# Patient Record
Sex: FEMALE | Race: WHITE | NOT HISPANIC OR LATINO | Employment: OTHER | URBAN - METROPOLITAN AREA
[De-identification: names, ages, dates, MRNs, and addresses within clinical notes are randomized per-mention and may not be internally consistent; named-entity substitution may affect disease eponyms.]

---

## 2018-05-06 ENCOUNTER — HOSPITAL ENCOUNTER (EMERGENCY)
Facility: HOSPITAL | Age: 48
Discharge: HOME | End: 2018-05-06
Attending: EMERGENCY MEDICINE
Payer: COMMERCIAL

## 2018-05-06 VITALS
RESPIRATION RATE: 18 BRPM | OXYGEN SATURATION: 100 % | HEART RATE: 90 BPM | BODY MASS INDEX: 45.82 KG/M2 | DIASTOLIC BLOOD PRESSURE: 84 MMHG | TEMPERATURE: 97.6 F | SYSTOLIC BLOOD PRESSURE: 137 MMHG | WEIGHT: 293 LBS

## 2018-05-06 DIAGNOSIS — M25.512 ACUTE PAIN OF LEFT SHOULDER: Primary | ICD-10-CM

## 2018-05-06 PROCEDURE — 99283 EMERGENCY DEPT VISIT LOW MDM: CPT

## 2018-05-06 RX ORDER — GABAPENTIN 100 MG/1
100 CAPSULE ORAL 2 TIMES DAILY
Qty: 28 CAPSULE | Refills: 0 | Status: SHIPPED | OUTPATIENT
Start: 2018-05-06 | End: 2018-10-16

## 2018-05-06 RX ORDER — MORPHINE SULFATE 30 MG/1
30 TABLET ORAL EVERY 4 HOURS PRN
COMMUNITY
End: 2018-10-16

## 2018-05-06 RX ORDER — GABAPENTIN 100 MG/1
100 CAPSULE ORAL ONCE
Status: COMPLETED | OUTPATIENT
Start: 2018-05-06 | End: 2018-05-06

## 2018-05-06 RX ORDER — PREDNISONE 20 MG/1
40 TABLET ORAL DAILY
Qty: 8 TABLET | Refills: 0 | Status: SHIPPED | OUTPATIENT
Start: 2018-05-07 | End: 2018-05-11

## 2018-05-06 RX ORDER — OXYCODONE HYDROCHLORIDE 15 MG/1
10 TABLET, FILM COATED, EXTENDED RELEASE ORAL EVERY 12 HOURS SCHEDULED
COMMUNITY
End: 2018-10-16

## 2018-05-06 RX ORDER — DIAZEPAM 5 MG/1
5 TABLET ORAL ONCE
Status: COMPLETED | OUTPATIENT
Start: 2018-05-06 | End: 2018-05-06

## 2018-05-06 RX ORDER — PREDNISONE 20 MG/1
40 TABLET ORAL ONCE
Status: COMPLETED | OUTPATIENT
Start: 2018-05-06 | End: 2018-05-06

## 2018-05-06 RX ORDER — TIZANIDINE HYDROCHLORIDE 6 MG/1
12 CAPSULE, GELATIN COATED ORAL
COMMUNITY
End: 2018-10-16

## 2018-05-06 RX ADMIN — PREDNISONE 40 MG: 20 TABLET ORAL at 08:21

## 2018-05-06 RX ADMIN — DIAZEPAM 5 MG: 5 TABLET ORAL at 08:28

## 2018-05-06 RX ADMIN — GABAPENTIN 100 MG: 100 CAPSULE ORAL at 08:21

## 2018-05-06 NOTE — ED NOTES
Discharge instructions and medications reviewed with pt  Pt verbalized understanding, with no further questions at this time  Pt ambulatory out of department, with , as ride home       Kelsey Wu RN  05/06/18 0780

## 2018-05-06 NOTE — DISCHARGE INSTRUCTIONS
Shoulder Pain   WHAT YOU NEED TO KNOW:   Shoulder pain is a common problem and can affect your daily activities  Pain can be caused by a problem within your shoulder  Shoulder pain may also be caused by pain that spreads to your shoulder from another part of your body  DISCHARGE INSTRUCTIONS:   Return to the emergency department if:   · You have severe pain  · You cannot move your arm or shoulder  · You have numbness or tingling in your shoulder or arm  Contact your healthcare provider if:   · Your pain gets worse or does not go away with treatment  · You have trouble moving your arm or shoulder  · You have questions or concerns about your condition or care  Medicines: You may need any of the following:  · Acetaminophen  decreases pain and fever  It is available without a doctor's order  Ask how much to take and how often to take it  Follow directions  Acetaminophen can cause liver damage if not taken correctly  · NSAIDs , such as ibuprofen, help decrease swelling, pain, and fever  This medicine is available with or without a doctor's order  NSAIDs can cause stomach bleeding or kidney problems in certain people  If you take blood thinner medicine, always ask your healthcare provider if NSAIDs are safe for you  Always read the medicine label and follow directions  · Take your medicine as directed  Contact your healthcare provider if you think your medicine is not helping or if you have side effects  Tell him of her if you are allergic to any medicine  Keep a list of the medicines, vitamins, and herbs you take  Include the amounts, and when and why you take them  Bring the list or the pill bottles to follow-up visits  Carry your medicine list with you in case of an emergency  Follow up with your healthcare provider or orthopedist as directed:  Write down your questions so you remember to ask them during your visits     Manage your symptoms:   · Apply ice  on your shoulder for 20 to 30 minutes every 2 hours or as directed  Use an ice pack, or put crushed ice in a plastic bag  Cover it with a towel  Ice helps prevent tissue damage and decreases swelling and pain  · Apply heat if ice does not help your symptoms  Apply heat on your shoulder for 20 to 30 minutes every 2 hours for as many days as directed  Heat helps decrease pain and muscle spasms  · Go to physical or occupational therapy as directed  A physical therapist teaches you exercises to help improve movement and strength, and to decrease pain  An occupational therapist teaches you skills to help with your daily activities  Prevent shoulder pain:   · Stretch and strengthen your shoulder  Use proper technique during exercises and sports  · Limit activities as directed  Try to avoid repeated overhead movements  © 2017 [facility] Information is for End User's use only and may not be sold, redistributed or otherwise used for commercial purposes  All illustrations and images included in CareNotes® are the copyrighted property of [employer]  or Abdoulaye Landa  The above information is an  only  It is not intended as medical advice for individual conditions or treatments  Talk to your doctor, nurse or pharmacist before following any medical regimen to see if it is safe and effective for you

## 2018-10-16 ENCOUNTER — OFFICE VISIT (OUTPATIENT)
Dept: FAMILY MEDICINE CLINIC | Facility: CLINIC | Age: 48
End: 2018-10-16
Payer: COMMERCIAL

## 2018-10-16 VITALS
OXYGEN SATURATION: 98 % | WEIGHT: 274 LBS | RESPIRATION RATE: 18 BRPM | SYSTOLIC BLOOD PRESSURE: 120 MMHG | HEIGHT: 67 IN | HEART RATE: 95 BPM | BODY MASS INDEX: 43 KG/M2 | DIASTOLIC BLOOD PRESSURE: 78 MMHG

## 2018-10-16 DIAGNOSIS — M79.7 FIBROMYALGIA: ICD-10-CM

## 2018-10-16 DIAGNOSIS — E66.01 CLASS 3 SEVERE OBESITY DUE TO EXCESS CALORIES WITHOUT SERIOUS COMORBIDITY WITH BODY MASS INDEX (BMI) OF 40.0 TO 44.9 IN ADULT (HCC): ICD-10-CM

## 2018-10-16 DIAGNOSIS — J02.9 PHARYNGITIS, UNSPECIFIED ETIOLOGY: ICD-10-CM

## 2018-10-16 DIAGNOSIS — M50.30 DEGENERATIVE DISC DISEASE, CERVICAL: ICD-10-CM

## 2018-10-16 DIAGNOSIS — Z13.220 LIPID SCREENING: ICD-10-CM

## 2018-10-16 DIAGNOSIS — Z00.00 WELL ADULT EXAM: Primary | ICD-10-CM

## 2018-10-16 DIAGNOSIS — Z12.31 SCREENING MAMMOGRAM, ENCOUNTER FOR: ICD-10-CM

## 2018-10-16 DIAGNOSIS — R59.1 LYMPHADENOPATHY OF HEAD AND NECK: ICD-10-CM

## 2018-10-16 DIAGNOSIS — M50.20 CERVICAL HERNIATED DISC: ICD-10-CM

## 2018-10-16 PROBLEM — E66.813 CLASS 3 SEVERE OBESITY DUE TO EXCESS CALORIES WITHOUT SERIOUS COMORBIDITY WITH BODY MASS INDEX (BMI) OF 40.0 TO 44.9 IN ADULT (HCC): Status: ACTIVE | Noted: 2018-10-16

## 2018-10-16 PROCEDURE — 99386 PREV VISIT NEW AGE 40-64: CPT | Performed by: NURSE PRACTITIONER

## 2018-10-16 PROCEDURE — 1036F TOBACCO NON-USER: CPT | Performed by: NURSE PRACTITIONER

## 2018-10-16 PROCEDURE — 99214 OFFICE O/P EST MOD 30 MIN: CPT | Performed by: NURSE PRACTITIONER

## 2018-10-16 PROCEDURE — 3008F BODY MASS INDEX DOCD: CPT | Performed by: NURSE PRACTITIONER

## 2018-10-16 RX ORDER — GABAPENTIN 300 MG/1
300 CAPSULE ORAL 3 TIMES DAILY
Refills: 1 | COMMUNITY
Start: 2018-09-19

## 2018-10-16 RX ORDER — TIZANIDINE 4 MG/1
8 TABLET ORAL
Refills: 3 | COMMUNITY
Start: 2018-10-03 | End: 2021-01-12 | Stop reason: ALTCHOICE

## 2018-10-16 RX ORDER — OXYCODONE HYDROCHLORIDE 5 MG/1
5 TABLET ORAL 2 TIMES DAILY PRN
Refills: 0 | COMMUNITY
Start: 2018-09-21

## 2018-10-16 RX ORDER — MELOXICAM 15 MG/1
TABLET ORAL
Refills: 2 | COMMUNITY
Start: 2018-09-22

## 2018-10-16 RX ORDER — AMOXICILLIN 875 MG/1
875 TABLET, COATED ORAL 2 TIMES DAILY
Qty: 10 TABLET | Refills: 0 | Status: SHIPPED | OUTPATIENT
Start: 2018-10-16 | End: 2018-10-21

## 2018-10-16 NOTE — PROGRESS NOTES
Assessment/Plan:    No problem-specific Assessment & Plan notes found for this encounter  Diagnoses and all orders for this visit:    Well adult exam  -     Comprehensive metabolic panel; Future  -     Lipid panel; Future    Lipid screening  -     Comprehensive metabolic panel; Future  -     Lipid panel; Future    Screening mammogram, encounter for  -     Mammo screening bilateral w cad; Future  -     Comprehensive metabolic panel; Future  -     Lipid panel; Future    Class 3 severe obesity due to excess calories without serious comorbidity with body mass index (BMI) of 40 0 to 44 9 in adult [facility])    Degenerative disc disease, cervical    Cervical herniated disc    Fibromyalgia    Pharyngitis, unspecified etiology    Lymphadenopathy of head and neck  -     CBC and differential; Future  -     amoxicillin (AMOXIL) 875 mg tablet; Take 1 tablet (875 mg total) by mouth 2 (two) times a day for 5 days    Other orders  -     oxyCODONE (ROXICODONE) 5 mg immediate release tablet; Take 5 mg by mouth 2 (two) times a day as needed  -     meloxicam (MOBIC) 15 mg tablet; TAKE 1 TABLET BY MOUTH EVERY DAY AS NEEDED (NOT DAILY USE)  -     gabapentin (NEURONTIN) 300 mg capsule; Take 300 mg by mouth 3 (three) times a day  -     tiZANidine (ZANAFLEX) 4 mg tablet; 8 mg daily at bedtime          Subjective:      Patient ID: Antonietta Franz is a 53 y o  female  Pt is here to become Established and have a work physical   She hasnt seen her PCP for "a while"  Difficulty getting appointments  She needs a Physical for work insurance  Chronic medical conditions:  Fibromyalgia- pain in all joints  She takes Oxycodone and Gabapentin  She sees Pain Mngmt in [place]  Degenerative disc disease of spine- she is getting steroid injections from her pain mngmnt  Frequent headaches- from cervical disc disease  Last bloodwork done 1 year ago     Needs Mammogram  Patient is also reporting swollen gland son left side, sore throat and no ear pain            The following portions of the patient's history were reviewed and updated as appropriate:   She  has a past medical history of Carpal tunnel syndrome; Fibromyalgia; Lupus; and Spinal stenosis  She   Patient Active Problem List    Diagnosis Date Noted    Lipid screening 10/16/2018    Screening mammogram, encounter for 10/16/2018    Class 3 severe obesity due to excess calories without serious comorbidity with body mass index (BMI) of 40 0 to 44 9 in adult [facility]) 10/16/2018    Degenerative disc disease, cervical 10/16/2018    Cervical herniated disc 10/16/2018    Fibromyalgia 10/16/2018    Pharyngitis 10/16/2018    Lymphadenopathy of head and neck 10/16/2018     She  has a past surgical history that includes  section and Knee surgery  Her family history includes Ovarian cancer in her sister  She  reports that she has never smoked  She has never used smokeless tobacco  She reports that she does not drink alcohol or use drugs  Current Outpatient Prescriptions   Medication Sig Dispense Refill    amoxicillin (AMOXIL) 875 mg tablet Take 1 tablet (875 mg total) by mouth 2 (two) times a day for 5 days 10 tablet 0    gabapentin (NEURONTIN) 300 mg capsule Take 300 mg by mouth 3 (three) times a day  1    meloxicam (MOBIC) 15 mg tablet TAKE 1 TABLET BY MOUTH EVERY DAY AS NEEDED (NOT DAILY USE)  2    oxyCODONE (ROXICODONE) 5 mg immediate release tablet Take 5 mg by mouth 2 (two) times a day as needed  0    tiZANidine (ZANAFLEX) 4 mg tablet 8 mg daily at bedtime  3     No current facility-administered medications for this visit        Current Outpatient Prescriptions on File Prior to Visit   Medication Sig    [DISCONTINUED] gabapentin (NEURONTIN) 100 mg capsule Take 1 capsule (100 mg total) by mouth 2 (two) times a day for 14 days    [DISCONTINUED] morphine (MSIR) 30 MG tablet Take 30 mg by mouth every 4 (four) hours as needed for severe pain    [DISCONTINUED] oxyCODONE (OxyCONTIN) 15 mg 12 hr tablet Take 10 mg by mouth every 12 (twelve) hours    [DISCONTINUED] TiZANidine (ZANAFLEX) 6 MG capsule Take 12 mg by mouth     No current facility-administered medications on file prior to visit  She is allergic to epinephrine; lidocaine; and moxifloxacin       Review of Systems   Constitutional: Negative  Negative for chills, fatigue, fever and unexpected weight change  HENT: Positive for sore throat  Negative for congestion, dental problem, ear pain, trouble swallowing and voice change  Eyes: Positive for visual disturbance  Negative for photophobia, pain, discharge, redness and itching  Awaiting appmnt with eye doctor   Respiratory: Negative for cough, chest tightness, shortness of breath, wheezing and stridor  Cardiovascular: Negative  Negative for chest pain, palpitations and leg swelling  Gastrointestinal: Negative  Negative for abdominal distention, abdominal pain, constipation, diarrhea, nausea and vomiting  Endocrine: Negative for polydipsia and polyuria  Genitourinary: Negative for difficulty urinating and dysuria  Musculoskeletal: Positive for arthralgias, back pain, myalgias, neck pain and neck stiffness  Skin: Negative  Negative for rash  Allergic/Immunologic: Negative for immunocompromised state  Neurological: Positive for headaches  Negative for dizziness, tremors, seizures, weakness, light-headedness and numbness  Hematological: Positive for adenopathy  Does not bruise/bleed easily  Left side of neck   Psychiatric/Behavioral: Negative  Negative for confusion and sleep disturbance  The patient is not nervous/anxious  All other systems reviewed and are negative  Objective:      /78 (BP Location: Left arm, Patient Position: Sitting)   Pulse 95   Resp 18   Ht 5' 7" (1 702 m)   Wt 124 kg (274 lb)   SpO2 98%   BMI 42 91 kg/m²          Physical Exam   Constitutional: She is oriented to person, place, and time   She appears well-developed and well-nourished  No distress  HENT:   Head: Normocephalic and atraumatic  Right Ear: External ear normal    Left Ear: External ear normal    Nose: Nose normal    Mouth/Throat: Oropharynx is clear and moist  No oropharyngeal exudate  No tonsils   Eyes: Pupils are equal, round, and reactive to light  Conjunctivae and EOM are normal  Right eye exhibits no discharge  Left eye exhibits no discharge  No scleral icterus  Neck: Normal range of motion  Neck supple  No JVD present  No thyromegaly present  Tender left side anterior cervical adenopathy   Cardiovascular: Normal rate, regular rhythm, normal heart sounds and intact distal pulses  Exam reveals no gallop and no friction rub  No murmur heard  Pulmonary/Chest: Effort normal and breath sounds normal  No respiratory distress  She has no wheezes  Abdominal: Soft  Bowel sounds are normal  She exhibits no distension and no mass  There is no tenderness  obesity   Musculoskeletal: Normal range of motion  She exhibits no edema, tenderness or deformity  Lymphadenopathy:     She has cervical adenopathy  Neurological: She is alert and oriented to person, place, and time  She exhibits normal muscle tone  Coordination normal    Skin: Skin is warm and dry  No rash noted  She is not diaphoretic  No pallor  Psychiatric: She has a normal mood and affect  Her behavior is normal  Judgment and thought content normal    Nursing note and vitals reviewed

## 2018-10-16 NOTE — PATIENT INSTRUCTIONS
Establish care visit  Our office will complete form for work insurance and fax, when labs are back  Obesity- encourage daily exercise  Limit calories to 1800 a day  Limit carbohydrates and sugars  Pharyngitis- no abnormality on exam   Lymphadenopathy- with tenderness- start brief course of antibiotics  Obtain labs PRIOR to starting antibiotic  Cervical disc disease and fibromyalgia- followed by [facility]  Obtain screening mammogram   Our office will call with lab results  Heart Healthy Diet   AMBULATORY CARE:   A heart healthy diet  is an eating plan low in total fat, unhealthy fats, and sodium (salt)  A heart healthy diet helps decrease your risk for heart disease and stroke  Limit the amount of fat you eat to 25% to 35% of your total daily calories  Limit sodium to less than 2,300 mg each day  Healthy fats:  Healthy fats can help improve cholesterol levels  The risk for heart disease is decreased when cholesterol levels are normal  Choose healthy fats, such as the following:  · Unsaturated fat  is found in foods such as soybean, canola, olive, corn, and safflower oils  It is also found in soft tub margarine that is made with liquid vegetable oil  · Omega-3 fat  is found in certain fish, such as salmon, tuna, and trout, and in walnuts and flaxseed  Unhealthy fats:  Unhealthy fats can cause unhealthy cholesterol levels in your blood and increase your risk of heart disease  Limit unhealthy fats, such as the following:  · Cholesterol  is found in animal foods, such as eggs and lobster, and in dairy products made from whole milk  Limit cholesterol to less than 300 milligrams (mg) each day  You may need to limit cholesterol to 200 mg each day if you have heart disease  · Saturated fat  is found in meats, such as farmer and hamburger  It is also found in chicken or turkey skin, whole milk, and butter  Limit saturated fat to less than 7% of your total daily calories   Limit saturated fat to less than 6% if you have heart disease or are at increased risk for it  · Trans fat  is found in packaged foods, such as potato chips and cookies  It is also in hard margarine, some fried foods, and shortening  Avoid trans fats as much as possible    Heart healthy foods and drinks to include:  Ask your dietitian or healthcare provider how many servings to have from each of the following food groups:  · Grains:      ¨ Whole-wheat breads, cereals, and pastas, and brown rice    ¨ Low-fat, low-sodium crackers and chips    · Vegetables:      ¨ Broccoli, green beans, green peas, and spinach    ¨ Collards, kale, and lima beans    ¨ Carrots, sweet potatoes, tomatoes, and peppers    ¨ Canned vegetables with no salt added    · Fruits:      ¨ Bananas, peaches, pears, and pineapple    ¨ Grapes, raisins, and dates    ¨ Oranges, tangerines, grapefruit, orange juice, and grapefruit juice    ¨ Apricots, mangoes, melons, and papaya    ¨ Raspberries and strawberries    ¨ Canned fruit with no added sugar    · Low-fat dairy products:      ¨ Nonfat (skim) milk, 1% milk, and low-fat almond, cashew, or soy milks fortified with calcium    ¨ Low-fat cheese, regular or frozen yogurt, and cottage cheese    · Meats and proteins , such as lean cuts of beef and pork (loin, leg, round), skinless chicken and turkey, legumes, soy products, egg whites, and nuts  Foods and drinks to limit or avoid:  Ask your dietitian or healthcare provider about these and other foods that are high in unhealthy fat, sodium, and sugar:  · Snack or packaged foods , such as frozen dinners, cookies, macaroni and cheese, and cereals with more than 300 mg of sodium per serving    · Canned or dry mixes  for cakes, soups, sauces, or gravies    · Vegetables with added sodium , such as instant potatoes, vegetables with added sauces, or regular canned vegetables    · Other foods high in sodium , such as ketchup, barbecue sauce, salad dressing, pickles, olives, soy sauce, and miso    · High-fat dairy foods  such as whole or 2% milk, cream cheese, or sour cream, and cheeses     · High-fat protein foods  such as high-fat cuts of beef (T-bone steaks, ribs), chicken or turkey with skin, and organ meats, such as liver    · Cured or smoked meats , such as hot dogs, farmer, and sausage    · Unhealthy fats and oils , such as butter, stick margarine, shortening, and cooking oils such as coconut or palm oil    · Food and drinks high in sugar , such as soft drinks (soda), sports drinks, sweetened tea, candy, cake, cookies, pies, and doughnuts  Other diet guidelines to follow:   · Eat more foods containing omega-3 fats  Eat fish high in omega-3 fats at least 2 times a week  · Limit alcohol  Too much alcohol can damage your heart and raise your blood pressure  Women should limit alcohol to 1 drink a day  Men should limit alcohol to 2 drinks a day  A drink of alcohol is 12 ounces of beer, 5 ounces of wine, or 1½ ounces of liquor  · Choose low-sodium foods  High-sodium foods can lead to high blood pressure  Add little or no salt to food you prepare  Use herbs and spices in place of salt  · Eat more fiber  to help lower cholesterol levels  Eat at least 5 servings of fruits and vegetables each day  Eat 3 ounces of whole-grain foods each day  Legumes (beans) are also a good source of fiber  Lifestyle guidelines:   · Do not smoke  Nicotine and other chemicals in cigarettes and cigars can cause lung and heart damage  Ask your healthcare provider for information if you currently smoke and need help to quit  E-cigarettes or smokeless tobacco still contain nicotine  Talk to your healthcare provider before you use these products  · Exercise regularly  to help you maintain a healthy weight and improve your blood pressure and cholesterol levels  Ask your healthcare provider about the best exercise plan for you  Do not start an exercise program without asking your healthcare provider     Follow up with your healthcare provider as directed:  Write down your questions so you remember to ask them during your visits  © 2017 [employer] Information is for End User's use only and may not be sold, redistributed or otherwise used for commercial purposes  All illustrations and images included in CareNotes® are the copyrighted property of A D A M [employer]  or Abdoulaye Landa  The above information is an  only  It is not intended as medical advice for individual conditions or treatments  Talk to your doctor, nurse or pharmacist before following any medical regimen to see if it is safe and effective for you  Wellness Visit for Adults   WHAT YOU NEED TO KNOW:   What is a wellness visit? A wellness visit is when you see your healthcare provider to get screened for health problems  You can also get advice on how to stay healthy  Write down your questions so you remember to ask them  Ask your healthcare provider how often you should have a wellness visit  What happens at a wellness visit? Your healthcare provider will ask about your health, and your family history of health problems  This includes high blood pressure, heart disease, and cancer  He or she will ask if you have symptoms that concern you, if you smoke, and about your mood  You may also be asked about your intake of medicines, supplements, food, and alcohol  Any of the following may be done:  · Your weight  will be checked  Your height may also be checked so your body mass index (BMI) can be calculated  Your BMI shows if you are at a healthy weight  · Your blood pressure  and heart rate will be checked  Your temperature may also be checked  · Blood and urine tests  may be done  Blood tests may be done to check your cholesterol levels  Abnormal cholesterol levels increase your risk for heart disease and stroke  You may also need a blood or urine test to check for diabetes if you are at increased risk   Urine tests may be done to look for signs of an infection or kidney disease  · A physical exam  includes checking your heartbeat and lungs with a stethoscope  Your healthcare provider may also check your skin to look for sun damage  · Screening tests  may be recommended  A screening test is done to check for diseases that may not cause symptoms  The screening tests you may need depend on your age, gender, family history, and lifestyle habits  For example, colorectal screening may be recommended if you are 48years old or older  What screening tests do I need if I am a woman? · A Pap smear  is used to screen for cervical cancer  Pap smears are usually done every 3 to 5 years depending on your age  You may need them more often if you have had abnormal Pap smear test results in the past  Ask your healthcare provider how often you should have a Pap smear  · A mammogram  is an x-ray of your breasts to screen for breast cancer  Experts recommend mammograms every 2 years starting at age 48 years  You may need a mammogram at age 52 years or younger if you have an increased risk for breast cancer  Talk to your healthcare provider about when you should start having mammograms and how often you need them  What vaccines might I need? · Get an influenza vaccine  every year  The influenza vaccine protects you from the flu  Several types of viruses cause the flu  The viruses change over time, so new vaccines are made each year  · Get a tetanus-diphtheria (Td) booster vaccine  every 10 years  This vaccine protects you against tetanus and diphtheria  Tetanus is a severe infection that may cause painful muscle spasms and lockjaw  Diphtheria is a severe bacterial infection that causes a thick covering in the back of your mouth and throat  · Get a human papillomavirus (HPV) vaccine  if you are female and aged 23 to 32 or male 23 to 24 and never received it  This vaccine protects you from HPV infection   HPV is the most common infection spread by sexual contact  HPV may also cause vaginal, penile, and anal cancers  · Get a pneumococcal vaccine  if you are aged 72 years or older  The pneumococcal vaccine is an injection given to protect you from pneumococcal disease  Pneumococcal disease is an infection caused by pneumococcal bacteria  The infection may cause pneumonia, meningitis, or an ear infection  · Get a shingles vaccine  if you are aged 61 or older, even if you have had shingles before  The shingles vaccine is an injection to protect you from the varicella-zoster virus  This is the same virus that causes chickenpox  Shingles is a painful rash that develops in people who had chickenpox or have been exposed to the virus  How can I eat healthy? My Plate is a model for planning healthy meals  It shows the types and amounts of foods that should go on your plate  Fruits and vegetables make up about half of your plate, and grains and protein make up the other half  A serving of dairy is included on the side of your plate  The amount of calories and serving sizes you need depends on your age, gender, weight, and height  Examples of healthy foods are listed below:  · Eat a variety of vegetables  such as dark green, red, and orange vegetables  You can also include canned vegetables low in sodium (salt) and frozen vegetables without added butter or sauces  · Eat a variety of fresh fruits , canned fruit in 100% juice, frozen fruit, and dried fruit  · Include whole grains  At least half of the grains you eat should be whole grains  Examples include whole-wheat bread, wheat pasta, brown rice, and whole-grain cereals such as oatmeal     · Eat a variety of protein foods such as seafood (fish and shellfish), lean meat, and poultry without skin (turkey and chicken)  Examples of lean meats include pork leg, shoulder, or tenderloin, and beef round, sirloin, tenderloin, and extra lean ground beef   Other protein foods include eggs and egg substitutes, beans, peas, soy products, nuts, and seeds  · Choose low-fat dairy products such as skim or 1% milk or low-fat yogurt, cheese, and cottage cheese  · Limit unhealthy fats  such as butter, hard margarine, and shortening  How much exercise do I need? Exercise at least 30 minutes per day on most days of the week  Some examples of exercise include walking, biking, dancing, and swimming  You can also fit in more physical activity by taking the stairs instead of the elevator or parking farther away from stores  Include muscle strengthening activities 2 days each week  Regular exercise provides many health benefits  It helps you manage your weight, and decreases your risk for type 2 diabetes, heart disease, stroke, and high blood pressure  Exercise can also help improve your mood  Ask your healthcare provider about the best exercise plan for you  What are some general health and safety guidelines I should follow? · Do not smoke  Nicotine and other chemicals in cigarettes and cigars can cause lung damage  Ask your healthcare provider for information if you currently smoke and need help to quit  E-cigarettes or smokeless tobacco still contain nicotine  Talk to your healthcare provider before you use these products  · Limit alcohol  A drink of alcohol is 12 ounces of beer, 5 ounces of wine, or 1½ ounces of liquor  · Lose weight, if needed  Being overweight increases your risk of certain health conditions  These include heart disease, high blood pressure, type 2 diabetes, and certain types of cancer  · Protect your skin  Do not sunbathe or use tanning beds  Use sunscreen with a SPF 15 or higher  Apply sunscreen at least 15 minutes before you go outside  Reapply sunscreen every 2 hours  Wear protective clothing, hats, and sunglasses when you are outside  · Drive safely  Always wear your seatbelt  Make sure everyone in your car wears a seatbelt   A seatbelt can save your life if you are in an accident  Do not use your cell phone when you are driving  This could distract you and cause an accident  Pull over if you need to make a call or send a text message  · Practice safe sex  Use latex condoms if are sexually active and have more than one partner  Your healthcare provider may recommend screening tests for sexually transmitted infections (STIs)  · Wear helmets, lifejackets, and protective gear  Always wear a helmet when you ride a bike or motorcycle, go skiing, or play sports that could cause a head injury  Wear protective equipment when you play sports  Wear a lifejacket when you are on a boat or doing water sports  CARE AGREEMENT:   You have the right to help plan your care  Learn about your health condition and how it may be treated  Discuss treatment options with your caregivers to decide what care you want to receive  You always have the right to refuse treatment  The above information is an  only  It is not intended as medical advice for individual conditions or treatments  Talk to your doctor, nurse or pharmacist before following any medical regimen to see if it is safe and effective for you  © 2017 [facility] [facility] Information is for End User's use only and may not be sold, redistributed or otherwise used for commercial purposes  All illustrations and images included in CareNotes® are the copyrighted property of A D A M , Inc  or Abdoulaye Landa

## 2018-10-20 ENCOUNTER — APPOINTMENT (OUTPATIENT)
Dept: LAB | Facility: CLINIC | Age: 48
End: 2018-10-20
Payer: COMMERCIAL

## 2018-10-20 DIAGNOSIS — Z13.220 LIPID SCREENING: ICD-10-CM

## 2018-10-20 DIAGNOSIS — Z00.00 WELL ADULT EXAM: ICD-10-CM

## 2018-10-20 DIAGNOSIS — R59.1 LYMPHADENOPATHY OF HEAD AND NECK: ICD-10-CM

## 2018-10-20 DIAGNOSIS — Z12.31 SCREENING MAMMOGRAM, ENCOUNTER FOR: ICD-10-CM

## 2018-10-20 LAB
ALBUMIN SERPL BCP-MCNC: 3.8 G/DL (ref 3.5–5)
ALP SERPL-CCNC: 56 U/L (ref 46–116)
ALT SERPL W P-5'-P-CCNC: 37 U/L (ref 12–78)
ANION GAP SERPL CALCULATED.3IONS-SCNC: 5 MMOL/L (ref 4–13)
AST SERPL W P-5'-P-CCNC: 18 U/L (ref 5–45)
BASOPHILS # BLD AUTO: 0.07 THOUSANDS/ΜL (ref 0–0.1)
BASOPHILS NFR BLD AUTO: 1 % (ref 0–1)
BILIRUB SERPL-MCNC: 0.59 MG/DL (ref 0.2–1)
BUN SERPL-MCNC: 11 MG/DL (ref 5–25)
CALCIUM ALBUM COR SERPL-MCNC: 10.4 MG/DL (ref 8.3–10.1)
CALCIUM SERPL-MCNC: 10.2 MG/DL (ref 8.3–10.1)
CHLORIDE SERPL-SCNC: 106 MMOL/L (ref 100–108)
CHOLEST SERPL-MCNC: 178 MG/DL (ref 50–200)
CO2 SERPL-SCNC: 27 MMOL/L (ref 21–32)
CREAT SERPL-MCNC: 0.57 MG/DL (ref 0.6–1.3)
EOSINOPHIL # BLD AUTO: 0.1 THOUSAND/ΜL (ref 0–0.61)
EOSINOPHIL NFR BLD AUTO: 2 % (ref 0–6)
ERYTHROCYTE [DISTWIDTH] IN BLOOD BY AUTOMATED COUNT: 12.9 % (ref 11.6–15.1)
GFR SERPL CREATININE-BSD FRML MDRD: 111 ML/MIN/1.73SQ M
GLUCOSE P FAST SERPL-MCNC: 79 MG/DL (ref 65–99)
HCT VFR BLD AUTO: 39.5 % (ref 34.8–46.1)
HDLC SERPL-MCNC: 50 MG/DL (ref 40–60)
HGB BLD-MCNC: 12.2 G/DL (ref 11.5–15.4)
IMM GRANULOCYTES # BLD AUTO: 0.02 THOUSAND/UL (ref 0–0.2)
IMM GRANULOCYTES NFR BLD AUTO: 0 % (ref 0–2)
LDLC SERPL CALC-MCNC: 105 MG/DL (ref 0–100)
LYMPHOCYTES # BLD AUTO: 2.39 THOUSANDS/ΜL (ref 0.6–4.47)
LYMPHOCYTES NFR BLD AUTO: 41 % (ref 14–44)
MCH RBC QN AUTO: 28.6 PG (ref 26.8–34.3)
MCHC RBC AUTO-ENTMCNC: 30.9 G/DL (ref 31.4–37.4)
MCV RBC AUTO: 93 FL (ref 82–98)
MONOCYTES # BLD AUTO: 0.58 THOUSAND/ΜL (ref 0.17–1.22)
MONOCYTES NFR BLD AUTO: 10 % (ref 4–12)
NEUTROPHILS # BLD AUTO: 2.67 THOUSANDS/ΜL (ref 1.85–7.62)
NEUTS SEG NFR BLD AUTO: 46 % (ref 43–75)
NONHDLC SERPL-MCNC: 128 MG/DL
NRBC BLD AUTO-RTO: 0 /100 WBCS
PLATELET # BLD AUTO: 341 THOUSANDS/UL (ref 149–390)
PMV BLD AUTO: 10.8 FL (ref 8.9–12.7)
POTASSIUM SERPL-SCNC: 4.2 MMOL/L (ref 3.5–5.3)
PROT SERPL-MCNC: 6.9 G/DL (ref 6.4–8.2)
RBC # BLD AUTO: 4.26 MILLION/UL (ref 3.81–5.12)
SODIUM SERPL-SCNC: 138 MMOL/L (ref 136–145)
TRIGL SERPL-MCNC: 114 MG/DL
WBC # BLD AUTO: 5.83 THOUSAND/UL (ref 4.31–10.16)

## 2018-10-20 PROCEDURE — 85025 COMPLETE CBC W/AUTO DIFF WBC: CPT

## 2018-10-20 PROCEDURE — 80061 LIPID PANEL: CPT

## 2018-10-20 PROCEDURE — 80053 COMPREHEN METABOLIC PANEL: CPT

## 2018-10-20 PROCEDURE — 36415 COLL VENOUS BLD VENIPUNCTURE: CPT

## 2018-10-21 DIAGNOSIS — E83.52 HYPERCALCEMIA: Primary | ICD-10-CM

## 2018-12-04 DIAGNOSIS — F41.8 ANXIETY ABOUT HEALTH: ICD-10-CM

## 2018-12-04 DIAGNOSIS — E04.1 THYROID NODULE: ICD-10-CM

## 2018-12-04 DIAGNOSIS — R22.1 MASS OF RIGHT SIDE OF NECK: ICD-10-CM

## 2018-12-04 DIAGNOSIS — R16.0 LIVER MASS, RIGHT LOBE: Primary | ICD-10-CM

## 2018-12-04 RX ORDER — ALPRAZOLAM 0.25 MG/1
0.5 TABLET ORAL
Qty: 2 TABLET | Refills: 0 | Status: SHIPPED | OUTPATIENT
Start: 2018-12-04 | End: 2020-11-05 | Stop reason: ALTCHOICE

## 2018-12-18 ENCOUNTER — HOSPITAL ENCOUNTER (OUTPATIENT)
Dept: CT IMAGING | Facility: CLINIC | Age: 48
Discharge: HOME | End: 2018-12-18
Payer: COMMERCIAL

## 2018-12-18 DIAGNOSIS — E04.1 THYROID NODULE: ICD-10-CM

## 2018-12-18 DIAGNOSIS — R16.0 LIVER MASS, RIGHT LOBE: ICD-10-CM

## 2018-12-18 DIAGNOSIS — R22.1 MASS OF RIGHT SIDE OF NECK: ICD-10-CM

## 2018-12-18 PROCEDURE — 70491 CT SOFT TISSUE NECK W/DYE: CPT

## 2018-12-18 RX ADMIN — IOHEXOL 100 ML: 350 INJECTION, SOLUTION INTRAVENOUS at 08:19

## 2018-12-19 DIAGNOSIS — R22.1 MASS OF LEFT SIDE OF NECK: Primary | ICD-10-CM

## 2018-12-19 RX ADMIN — IOHEXOL 100 ML: 350 INJECTION, SOLUTION INTRAVENOUS at 09:49

## 2018-12-26 ENCOUNTER — HOSPITAL ENCOUNTER (OUTPATIENT)
Dept: CT IMAGING | Facility: CLINIC | Age: 48
Discharge: HOME | End: 2018-12-26
Payer: COMMERCIAL

## 2018-12-26 DIAGNOSIS — R16.0 HEPATOMEGALY: ICD-10-CM

## 2018-12-26 PROCEDURE — 71260 CT THORAX DX C+: CPT

## 2018-12-26 PROCEDURE — 74177 CT ABD & PELVIS W/CONTRAST: CPT

## 2018-12-26 RX ADMIN — IOHEXOL 100 ML: 350 INJECTION, SOLUTION INTRAVENOUS at 14:38

## 2018-12-27 DIAGNOSIS — I27.20 PULMONARY HYPERTENSION (HCC): Primary | ICD-10-CM

## 2019-01-07 DIAGNOSIS — R22.1 NECK MASS: Primary | ICD-10-CM

## 2019-01-19 ENCOUNTER — OFFICE VISIT (OUTPATIENT)
Dept: URGENT CARE | Facility: CLINIC | Age: 49
End: 2019-01-19
Payer: COMMERCIAL

## 2019-01-19 VITALS
WEIGHT: 274 LBS | DIASTOLIC BLOOD PRESSURE: 82 MMHG | RESPIRATION RATE: 18 BRPM | OXYGEN SATURATION: 98 % | HEIGHT: 67 IN | TEMPERATURE: 98.7 F | SYSTOLIC BLOOD PRESSURE: 128 MMHG | HEART RATE: 89 BPM | BODY MASS INDEX: 43 KG/M2

## 2019-01-19 DIAGNOSIS — J20.8 ACUTE BACTERIAL BRONCHITIS: Primary | ICD-10-CM

## 2019-01-19 DIAGNOSIS — B96.89 ACUTE BACTERIAL BRONCHITIS: Primary | ICD-10-CM

## 2019-01-19 PROCEDURE — 99213 OFFICE O/P EST LOW 20 MIN: CPT | Performed by: PHYSICIAN ASSISTANT

## 2019-01-19 RX ORDER — AZITHROMYCIN 250 MG/1
TABLET, FILM COATED ORAL
Qty: 6 TABLET | Refills: 0 | Status: SHIPPED | OUTPATIENT
Start: 2019-01-19 | End: 2019-01-23

## 2019-01-19 RX ORDER — METHYLPREDNISOLONE 4 MG/1
TABLET ORAL
Qty: 1 EACH | Refills: 0 | Status: SHIPPED | OUTPATIENT
Start: 2019-01-19 | End: 2020-11-05 | Stop reason: ALTCHOICE

## 2019-01-19 RX ORDER — MORPHINE SULFATE 15 MG/1
15 TABLET, FILM COATED, EXTENDED RELEASE ORAL EVERY 12 HOURS
Refills: 0 | COMMUNITY
Start: 2018-12-21

## 2019-01-19 NOTE — PROGRESS NOTES
[facility]        NAME: Luther Doherty is a 53 y o  female  : 1970    MRN: [id number]  DATE: 2019  TIME: 9:51 AM    Assessment and Plan   Acute bacterial bronchitis [J20 8, B96 89]  1  Acute bacterial bronchitis  azithromycin (ZITHROMAX) 250 mg tablet    methylPREDNISolone 4 MG tablet therapy pack         Patient Instructions     Follow up with PCP in 3-5 days  Proceed to  ER if symptoms worsen  Chief Complaint     Chief Complaint   Patient presents with    Cough     x1 wk  Non productive    Sore Throat    Ear Fullness     r ear         History of Present Illness       52-year-old female presents, sore throat, ear pain for 1 week  Patient states he has a history of bronchitis  She states the cough is nonproductive  She denies fever, chills, shortness of breath, wheezing  Review of Systems   Review of Systems   Constitutional: Negative for chills, fatigue and fever  HENT: Positive for ear pain and sore throat  Negative for congestion, sinus pain and trouble swallowing  Eyes: Negative for pain, discharge and redness  Respiratory: Positive for cough  Negative for chest tightness, shortness of breath and wheezing  Cardiovascular: Negative for chest pain, palpitations and leg swelling  Gastrointestinal: Negative for abdominal pain, diarrhea, nausea and vomiting  Musculoskeletal: Negative for arthralgias, joint swelling and myalgias  Skin: Negative for rash  Neurological: Negative for dizziness, weakness, numbness and headaches           Current Medications       Current Outpatient Prescriptions:     gabapentin (NEURONTIN) 300 mg capsule, Take 300 mg by mouth 3 (three) times a day, Disp: , Rfl: 1    meloxicam (MOBIC) 15 mg tablet, TAKE 1 TABLET BY MOUTH EVERY DAY AS NEEDED (NOT DAILY USE), Disp: , Rfl: 2    morphine (MS CONTIN) 15 mg 12 hr tablet, 15 mg every 12 (twelve) hours, Disp: , Rfl: 0    oxyCODONE (ROXICODONE) 5 mg immediate release tablet, Take 5 mg by mouth 2 (two) times a day as needed, Disp: , Rfl: 0    tiZANidine (ZANAFLEX) 4 mg tablet, 8 mg daily at bedtime, Disp: , Rfl: 3    ALPRAZolam (XANAX) 0 25 mg tablet, Take 2 tablets (0 5 mg total) by mouth once in imaging for anxiety (take prior to diagnostic testing ) for up to 1 dose (Patient not taking: Reported on 2019 ), Disp: 2 tablet, Rfl: 0    azithromycin (ZITHROMAX) 250 mg tablet, Take 2 tablets today then 1 tablet daily x 4 days, Disp: 6 tablet, Rfl: 0    methylPREDNISolone 4 MG tablet therapy pack, Use as directed on package, Disp: 1 each, Rfl: 0    Current Allergies     Allergies as of 2019 - Reviewed 2019   Allergen Reaction Noted    Epinephrine  2016    Lidocaine Hives 2018    Moxifloxacin Hives             The following portions of the patient's history were reviewed and updated as appropriate: allergies, current medications, past family history, past medical history, past social history, past surgical history and problem list      Past Medical History:   Diagnosis Date    Carpal tunnel syndrome     Fibromyalgia     Lupus     Spinal stenosis        Past Surgical History:   Procedure Laterality Date     SECTION      KNEE SURGERY      Knee Arthroscopy (Therapeutic)       Family History   Problem Relation Age of Onset    Ovarian cancer Sister          Medications have been verified  Objective   /82   Pulse 89   Temp 98 7 °F (37 1 °C) (Temporal)   Resp 18   Ht 5' 7" (1 702 m)   Wt 124 kg (274 lb)   SpO2 98%   BMI 42 91 kg/m²        Physical Exam     Physical Exam   Constitutional: She is oriented to person, place, and time  She appears well-developed and well-nourished  No distress  HENT:   Head: Normocephalic  Right Ear: External ear normal    Left Ear: External ear normal    Mouth/Throat: Oropharynx is clear and moist    Eyes: Pupils are equal, round, and reactive to light   Conjunctivae and EOM are normal    Neck: Normal range of motion  Neck supple  Cardiovascular: Normal rate, regular rhythm and normal heart sounds  No murmur heard  Pulmonary/Chest: Effort normal  No respiratory distress  She has no wheezes  She has rhonchi in the right lower field and the left lower field  Abdominal: Soft  Bowel sounds are normal  There is no tenderness  Musculoskeletal: Normal range of motion  Lymphadenopathy:     She has no cervical adenopathy  Neurological: She is alert and oriented to person, place, and time  She has normal reflexes  Skin: Skin is warm and dry  Psychiatric: She has a normal mood and affect  Nursing note and vitals reviewed

## 2020-11-05 ENCOUNTER — OFFICE VISIT (OUTPATIENT)
Dept: FAMILY MEDICINE CLINIC | Facility: CLINIC | Age: 50
End: 2020-11-05
Payer: COMMERCIAL

## 2020-11-05 VITALS
TEMPERATURE: 98.4 F | DIASTOLIC BLOOD PRESSURE: 96 MMHG | HEART RATE: 98 BPM | BODY MASS INDEX: 43.87 KG/M2 | OXYGEN SATURATION: 97 % | RESPIRATION RATE: 20 BRPM | HEIGHT: 66 IN | SYSTOLIC BLOOD PRESSURE: 158 MMHG | WEIGHT: 273 LBS

## 2020-11-05 DIAGNOSIS — R10.31 RIGHT LOWER QUADRANT ABDOMINAL PAIN: Primary | ICD-10-CM

## 2020-11-05 DIAGNOSIS — Z98.890 HISTORY OF REMOVAL OF CYST: ICD-10-CM

## 2020-11-05 PROCEDURE — 99213 OFFICE O/P EST LOW 20 MIN: CPT | Performed by: NURSE PRACTITIONER

## 2020-11-05 PROCEDURE — 1036F TOBACCO NON-USER: CPT | Performed by: NURSE PRACTITIONER

## 2020-11-05 PROCEDURE — 3008F BODY MASS INDEX DOCD: CPT | Performed by: NURSE PRACTITIONER

## 2020-11-05 PROCEDURE — 3725F SCREEN DEPRESSION PERFORMED: CPT | Performed by: NURSE PRACTITIONER

## 2020-11-13 ENCOUNTER — TELEPHONE (OUTPATIENT)
Dept: FAMILY MEDICINE CLINIC | Facility: CLINIC | Age: 50
End: 2020-11-13

## 2020-11-15 ENCOUNTER — HOSPITAL ENCOUNTER (OUTPATIENT)
Dept: ULTRASOUND IMAGING | Facility: HOSPITAL | Age: 50
Discharge: HOME | End: 2020-11-15
Payer: COMMERCIAL

## 2020-11-15 DIAGNOSIS — Z98.890 HISTORY OF REMOVAL OF CYST: ICD-10-CM

## 2020-11-15 DIAGNOSIS — R10.31 RIGHT LOWER QUADRANT ABDOMINAL PAIN: ICD-10-CM

## 2020-11-15 PROCEDURE — 76705 ECHO EXAM OF ABDOMEN: CPT

## 2020-11-18 DIAGNOSIS — R93.5 ABNORMAL US (ULTRASOUND) OF ABDOMEN: Primary | ICD-10-CM

## 2020-11-18 DIAGNOSIS — R10.31 RIGHT LOWER QUADRANT ABDOMINAL PAIN: ICD-10-CM

## 2020-12-17 ENCOUNTER — HOSPITAL ENCOUNTER (OUTPATIENT)
Dept: CT IMAGING | Facility: HOSPITAL | Age: 50
Discharge: HOME | End: 2020-12-17
Payer: COMMERCIAL

## 2020-12-17 DIAGNOSIS — R10.31 RIGHT LOWER QUADRANT ABDOMINAL PAIN: ICD-10-CM

## 2020-12-17 DIAGNOSIS — R93.5 ABNORMAL US (ULTRASOUND) OF ABDOMEN: ICD-10-CM

## 2020-12-17 PROCEDURE — 74176 CT ABD & PELVIS W/O CONTRAST: CPT

## 2020-12-17 PROCEDURE — G1004 CDSM NDSC: HCPCS

## 2020-12-18 ENCOUNTER — TELEPHONE (OUTPATIENT)
Dept: FAMILY MEDICINE CLINIC | Facility: CLINIC | Age: 50
End: 2020-12-18

## 2020-12-18 DIAGNOSIS — K76.9 HEPATIC LESION: Primary | ICD-10-CM

## 2020-12-18 NOTE — TELEPHONE ENCOUNTER
This patient was evaluated by Berenice Hughes for right lower abdominal pain  Her CT scan shows:  1) fatty liver and also an area of the liver which is thought to be due to a hemangioma however its increased in size recommend an MRI to better evaluate    2) Tiny kidney stone right side 3 mm non obstructing  3) Tiny umbilical hernia  She has an appt on 12/21 with Tawny she can discuss with her or make an appt with me to discuss further

## 2020-12-21 ENCOUNTER — TELEMEDICINE (OUTPATIENT)
Dept: FAMILY MEDICINE CLINIC | Facility: CLINIC | Age: 50
End: 2020-12-21
Payer: COMMERCIAL

## 2020-12-21 VITALS — WEIGHT: 270 LBS | HEIGHT: 66 IN | BODY MASS INDEX: 43.39 KG/M2

## 2020-12-21 DIAGNOSIS — F43.21 GRIEF AT LOSS OF CHILD: ICD-10-CM

## 2020-12-21 DIAGNOSIS — F41.8 DEPRESSION WITH ANXIETY: Primary | ICD-10-CM

## 2020-12-21 DIAGNOSIS — Z63.4 GRIEF AT LOSS OF CHILD: ICD-10-CM

## 2020-12-21 PROCEDURE — 3725F SCREEN DEPRESSION PERFORMED: CPT | Performed by: NURSE PRACTITIONER

## 2020-12-21 PROCEDURE — 3008F BODY MASS INDEX DOCD: CPT | Performed by: NURSE PRACTITIONER

## 2020-12-21 PROCEDURE — 99213 OFFICE O/P EST LOW 20 MIN: CPT | Performed by: NURSE PRACTITIONER

## 2020-12-21 PROCEDURE — 1036F TOBACCO NON-USER: CPT | Performed by: NURSE PRACTITIONER

## 2020-12-21 SDOH — SOCIAL STABILITY - SOCIAL INSECURITY: DISSAPEARANCE AND DEATH OF FAMILY MEMBER: Z63.4

## 2020-12-22 ENCOUNTER — TELEPHONE (OUTPATIENT)
Dept: PSYCHIATRY | Facility: CLINIC | Age: 50
End: 2020-12-22

## 2020-12-23 ENCOUNTER — TELEPHONE (OUTPATIENT)
Dept: FAMILY MEDICINE CLINIC | Facility: CLINIC | Age: 50
End: 2020-12-23

## 2020-12-23 DIAGNOSIS — F41.8 DEPRESSION WITH ANXIETY: Primary | ICD-10-CM

## 2020-12-23 NOTE — TELEPHONE ENCOUNTER
Patient contacted office and realized we had talked previously today  Patient again refused services because of a 3 month wait time  She was offered to be placed on the cancellation list but also refused

## 2020-12-23 NOTE — TELEPHONE ENCOUNTER
Behavorial Health Outpatient Intake Questions    Referred by:    Please advised interviewee that they need to answer all questions truthfully to allow for best care and any misrepresentations of information may affect their ability to be seen at this clinic   => Was this discussed? Yes     [facility] -     Presenting Problem (in patient's words): Patient reports having a hard time since daughter passed away in July  Are there any developmental disabilities? ? If yes, can they speak to you on the phone? If they are too limited to speak to you on phone, refer out No    Are you taking any psychiatric medications? No    => If yes, who prescribes? If yes, are they injectable medications? Does the patient have a language barrier or hearing impairment? No    Have you been treated at [facility] by a therapist or a doctor in the past? If yes, who? No    Has the patient been hospitalized for mental health? No   If yes, how long ago was last hospitalization and where was it? Do you actively use alcohol or marijuana or illegal substances? If yes, what and how much - refer out to Drug and alcohol treatment if use is excessive or daily use of illegal substances No concerns of substance abuse are reported  Do you have a community treatment team or ? No    Legal History-     Does the patient have any history of arrests, MCC/custodial time, or DUIs? No  If Yes-  1) What types of charges? 2) When were they last incarcerated? 3) Are they currently on parole or probation? Minor Child-    Who has custody of the child? Is there a custody agreement? If there is a custody agreement remind parent that they must bring a copy to the first appt or they will not be seen       Intake Team, please check with provider before scheduling if flags come up such as:  - complex case  - legal history (other than DUI)  - communication barrier concerns are present  - if, in your judgment, this needs further review    ACCEPTED as a patient Yes  => Appointment Date: Patient said she can not wait three months for an appointment     Referred Elsewhere? No    Name of Insurance Co:  Insurance ID#  [employer] #  If ins is primary or secondary  If patient is a minor, parents information such as Name, D  O B of guarantor

## 2021-01-05 ENCOUNTER — TELEPHONE (OUTPATIENT)
Dept: FAMILY MEDICINE CLINIC | Facility: CLINIC | Age: 51
End: 2021-01-05

## 2021-01-05 NOTE — TELEPHONE ENCOUNTER
A patient of Tawny's, on Zoloft - family history of Zoloft not helping but was asked to try it! She did and she has not noticed any improvement  Has an aunt that is a psychiatrist in [place] that thinks she should try Lexapro

## 2021-01-11 PROBLEM — Z13.220 LIPID SCREENING: Status: RESOLVED | Noted: 2018-10-16 | Resolved: 2021-01-11

## 2021-01-11 PROBLEM — R10.31 RIGHT LOWER QUADRANT ABDOMINAL PAIN: Status: RESOLVED | Noted: 2020-11-05 | Resolved: 2021-01-11

## 2021-01-11 PROBLEM — Z12.31 SCREENING MAMMOGRAM, ENCOUNTER FOR: Status: RESOLVED | Noted: 2018-10-16 | Resolved: 2021-01-11

## 2021-01-11 PROBLEM — Z98.890 HISTORY OF REMOVAL OF CYST: Status: RESOLVED | Noted: 2020-11-05 | Resolved: 2021-01-11

## 2021-01-11 PROBLEM — J02.9 PHARYNGITIS: Status: RESOLVED | Noted: 2018-10-16 | Resolved: 2021-01-11

## 2021-01-12 ENCOUNTER — TELEMEDICINE (OUTPATIENT)
Dept: FAMILY MEDICINE CLINIC | Facility: CLINIC | Age: 51
End: 2021-01-12
Payer: COMMERCIAL

## 2021-01-12 DIAGNOSIS — F41.8 DEPRESSION WITH ANXIETY: Primary | ICD-10-CM

## 2021-01-12 PROCEDURE — 99213 OFFICE O/P EST LOW 20 MIN: CPT | Performed by: NURSE PRACTITIONER

## 2021-01-12 PROCEDURE — 1036F TOBACCO NON-USER: CPT | Performed by: NURSE PRACTITIONER

## 2021-01-12 NOTE — PROGRESS NOTES
Virtual Regular Visit      Assessment/Plan:    Problem List Items Addressed This Visit        Other    Depression with anxiety - Primary     DW patient since her symptoms are remitting would like to continue with her Zoloft 50 mg and will recheck in 4 weeks  Patient also continuing with therapy                     Reason for visit is   Chief Complaint   Patient presents with    Virtual Regular Visit     medication check - zoloft family hx states her family has taken lexapro and that has worked for them   Virtual Regular Visit        Encounter provider BAKARI Miles    Provider located at [place] [address]  [address] [place] [id number]      Recent Visits  Date Type Provider Dept   01/05/21 Telephone JAVIER Mares Pg   Showing recent visits within past 7 days and meeting all other requirements     Today's Visits  Date Type Provider Dept   01/12/21 Telemedicine BAKARI Miles Pg   Showing today's visits and meeting all other requirements     Future Appointments  No visits were found meeting these conditions  Showing future appointments within next 150 days and meeting all other requirements        The patient was identified by name and date of birth  Ck Martinez was informed that this is a telemedicine visit and that the visit is being conducted through [address] [place] [address] and patient was informed that this is not a secure, HIPAA-compliant platform  She agrees to proceed     My office door was closed  No one else was in the room  She acknowledged consent and understanding of privacy and security of the video platform  The patient has agreed to participate and understands they can discontinue the visit at any time  Patient is aware this is a billable service  Subjective  Ck Martinez is a 53 y o  female    Patient calling today for her virtual follow up   Patient reports that she is doing well on the Zoloft 50mg daily and is taking for the past two weeks  She is also following with her therapist on a regular basis  Patient has depression and anxiety secondary to the loss of her 25year old daughter over the summer  Patient family takes Lexapro and works well for [employer] and was wondering if she should change  Patient was having symptoms of insomnia shaking anxiety and having difficulty with getting through the day but the Zoloft has greatly helped with her symptoms  Past Medical History:   Diagnosis Date    Carpal tunnel syndrome     Fibromyalgia     Lupus ([address] )     Spinal stenosis        Past Surgical History:   Procedure Laterality Date     SECTION      KNEE SURGERY      Knee Arthroscopy (Therapeutic)       Current Outpatient Medications   Medication Sig Dispense Refill    morphine (MS CONTIN) 15 mg 12 hr tablet 15 mg every 12 (twelve) hours  0    oxyCODONE (ROXICODONE) 5 mg immediate release tablet Take 5 mg by mouth 2 (two) times a day as needed  0    sertraline (ZOLOFT) 50 mg tablet Take 1 tablet (50 mg total) by mouth daily 30 tablet 5    gabapentin (NEURONTIN) 300 mg capsule Take 300 mg by mouth 3 (three) times a day  1    meloxicam (MOBIC) 15 mg tablet TAKE 1 TABLET BY MOUTH EVERY DAY AS NEEDED (NOT DAILY USE)  2     No current facility-administered medications for this visit  Allergies   Allergen Reactions    Epinephrine     Lidocaine Hives    Moxifloxacin Hives     avelox       Review of Systems   Constitutional: Negative for activity change, appetite change, chills, diaphoresis, fatigue, fever and unexpected weight change  HENT: Negative for congestion, ear pain, hearing loss, postnasal drip, sinus pressure, sinus pain, sneezing and sore throat  Eyes: Negative for pain, redness and visual disturbance  Respiratory: Negative for cough and shortness of breath  Cardiovascular: Negative for chest pain and leg swelling     Gastrointestinal: Negative for abdominal pain, diarrhea, nausea and vomiting  Musculoskeletal: Negative for arthralgias  Neurological: Negative for dizziness and light-headedness  Psychiatric/Behavioral: Negative for behavioral problems and dysphoric mood  The patient is nervous/anxious  Video Exam    Vitals:       Physical Exam  Vitals signs and nursing note reviewed  Constitutional:       Appearance: Normal appearance  HENT:      Head: Normocephalic and atraumatic  Right Ear: Tympanic membrane normal       Left Ear: Tympanic membrane normal       Nose: Nose normal       Mouth/Throat:      Mouth: Mucous membranes are moist    Cardiovascular:      Rate and Rhythm: Normal rate and regular rhythm  Pulses: Normal pulses  Pulmonary:      Effort: Pulmonary effort is normal    Abdominal:      Palpations: Abdomen is soft  Musculoskeletal: Normal range of motion  Skin:     General: Skin is warm and dry  Neurological:      Mental Status: She is alert and oriented to person, place, and time  Psychiatric:         Mood and Affect: Mood normal          Behavior: Behavior normal          Thought Content: Thought content normal          Judgment: Judgment normal           I spent 15 minutes directly with the patient during this visit      VIRTUAL VISIT DISCLAIMER    Rito Sherman acknowledges that she has consented to an online visit or consultation  She understands that the online visit is based solely on information provided by her, and that, in the absence of a face-to-face physical evaluation by the physician, the diagnosis she receives is both limited and provisional in terms of accuracy and completeness  This is not intended to replace a full medical face-to-face evaluation by the physician  Rito Sherman understands and accepts these terms

## 2021-01-12 NOTE — ASSESSMENT & PLAN NOTE
DW patient since her symptoms are remitting would like to continue with her Zoloft 50 mg and will recheck in 4 weeks   Patient also continuing with therapy

## 2021-01-13 ENCOUNTER — TELEPHONE (OUTPATIENT)
Dept: ADMINISTRATIVE | Facility: OTHER | Age: 51
End: 2021-01-13

## 2021-01-13 NOTE — TELEPHONE ENCOUNTER
Upon review of the In Basket request we were able to locate, review, and update the patient chart as requested for Pap Smear (HPV) aka Cervical Cancer Screening  Any additional questions or concerns should be emailed to the Practice Liaisons via [email or web address] email, please do not reply via In Basket      Thank you  Tamia Matamoros, [address]

## 2021-01-13 NOTE — TELEPHONE ENCOUNTER
----- Message from Jj Naidu MA sent at 1/12/2021 12:20 PM EST -----  Regarding: pap smear  01/12/21 12:20 PM    Hello, our patient Ramiro Cedeno has had Pap Smear (HPV) aka Cervical Cancer Screening completed/performed  Please assist in updating the patient chart by pulling the Care Everywhere (CE) document       Thank you,  JAVIER Rios PG

## 2021-01-25 ENCOUNTER — TELEPHONE (OUTPATIENT)
Dept: PSYCHIATRY | Facility: CLINIC | Age: 51
End: 2021-01-25

## 2021-01-25 NOTE — TELEPHONE ENCOUNTER
Patient called and would like to set up an apt she has a referral on file can you please give her a call thank you

## 2021-01-26 NOTE — TELEPHONE ENCOUNTER
Behavorial Health Outpatient Intake Questions    Referred by: PCP    Please advised interviewee that they need to answer all questions truthfully to allow for best care and any misrepresentations of information may affect their ability to be seen at this clinic   => Was this discussed? Yes     Behavorial Health Outpatient Intake History -     Presenting Problem (in patient's words): Patient's PCP prescribed zoloft, and she wants someone to manage medication and possibly look into new medications  Patient is being treated for depression  Are there any developmental disabilities? ? If yes, can they speak to you on the phone? If they are too limited to speak to you on phone, refer out No    Are you taking any psychiatric medications? Yes    => If yes, who prescribes? If yes, are they injectable medications? Does the patient have a language barrier or hearing impairment? No    Have you been treated at [facility] [place] by a therapist or a doctor in the past? If yes, who? No    Has the patient been hospitalized for mental health? No   If yes, how long ago was last hospitalization and where was it? Do you actively use alcohol or marijuana or illegal substances? If yes, what and how much - refer out to Drug and alcohol treatment if use is excessive or daily use of illegal substances No concerns of substance abuse are reported  Do you have a community treatment team or ? No    Legal History-     Does the patient have any history of arrests, correction/detention time, or DUIs? No  If Yes-  1) What types of charges? 2) When were they last incarcerated? 3) Are they currently on parole or probation? Minor Child-    Who has custody of the child? Is there a custody agreement? If there is a custody agreement remind parent that they must bring a copy to the first appt or they will not be seen       Intake Team, please check with provider before scheduling if flags come up such as:  - complex case  - legal history (other than DUI)  - communication barrier concerns are present  - if, in your judgment, this needs further review    ACCEPTED as a patient Yes  => Appointment Date: 4/19/21 at 2:00 pm with Dr Marimar Brown? No    Name of Insurance Co: [employer] ID# [id number]  Insurance Phone #  If ins is primary or secondary  If patient is a minor, parents information such as Name, D  O B of guarantor

## 2021-03-10 DIAGNOSIS — Z23 ENCOUNTER FOR IMMUNIZATION: ICD-10-CM

## 2021-03-13 ENCOUNTER — IMMUNIZATIONS (OUTPATIENT)
Dept: FAMILY MEDICINE CLINIC | Facility: HOSPITAL | Age: 51
End: 2021-03-13

## 2021-03-13 DIAGNOSIS — Z23 ENCOUNTER FOR IMMUNIZATION: Primary | ICD-10-CM

## 2021-03-13 PROCEDURE — 91300 SARS-COV-2 / COVID-19 MRNA VACCINE (PFIZER-BIONTECH) 30 MCG: CPT

## 2021-03-13 PROCEDURE — 0001A SARS-COV-2 / COVID-19 MRNA VACCINE (PFIZER-BIONTECH) 30 MCG: CPT

## 2021-04-03 ENCOUNTER — IMMUNIZATIONS (OUTPATIENT)
Dept: FAMILY MEDICINE CLINIC | Facility: HOSPITAL | Age: 51
End: 2021-04-03

## 2021-04-03 DIAGNOSIS — Z23 ENCOUNTER FOR IMMUNIZATION: Primary | ICD-10-CM

## 2021-04-03 PROCEDURE — 91300 SARS-COV-2 / COVID-19 MRNA VACCINE (PFIZER-BIONTECH) 30 MCG: CPT

## 2021-04-03 PROCEDURE — 0002A SARS-COV-2 / COVID-19 MRNA VACCINE (PFIZER-BIONTECH) 30 MCG: CPT

## 2021-06-01 ENCOUNTER — OFFICE VISIT (OUTPATIENT)
Dept: PSYCHIATRY | Facility: CLINIC | Age: 51
End: 2021-06-01
Payer: COMMERCIAL

## 2021-06-01 DIAGNOSIS — F41.8 DEPRESSION WITH ANXIETY: Primary | ICD-10-CM

## 2021-06-01 PROCEDURE — 90792 PSYCH DIAG EVAL W/MED SRVCS: CPT | Performed by: REGISTERED NURSE

## 2021-06-01 NOTE — BH TREATMENT PLAN
TREATMENT PLAN (Medication Management Only)        [facility]    Name and Date of Birth:  Chayito Pedersen 53 y o  1970  Date of Treatment Plan: June 1, 2021  Diagnosis/Diagnoses:    1  Depression with anxiety      Strengths/Personal Resources for Self-Care: supportive family, supportive friends, taking medications as prescribed, average or above intelligence, general fund of knowledge, motivation for treatment, willingness to work on problems  Area/Areas of need (in own words): anxiety, depression, attention and concentration problems  1  Long Term Goal: improve control of acceptable anxiety level  Target Date:6 months - 12/1/2021  Person/Persons responsible for completion of goal: Gilson Estrada and [address] [place]  2  Short Term Objective (s) - How will we reach this goal?:   A  Provider new recommended medication/dosage changes and/or continue medication(s): continue current medications as prescribed (Zoloft, today increasing to 75 mg po daily)  B  Take medications as prescribed  C  Continue therapy   Target Date:6 months - 12/1/2021  Person/Persons Responsible for Completion of Goal: Gilson Estrada and Dayami VILLEGAS  Progress Towards Goals: starting treatment  Treatment Modality: medication management every 1 months  Review due 180 days from date of this plan: 6 months - 12/1/2021  Expected length of service: ongoing treatment  My Physician/PA/NP and I have developed this plan together and I agree to work on the goals and objectives  I understand the treatment goals that were developed for my treatment

## 2021-06-01 NOTE — PSYCH
Outpatient Psychiatry Intake Exam       PCP: BAKARI Bernal    Referral source: self    Identifying information:  Mabel Stiles is a 53 y o  female with a history of depression here for evaluation and determination of mental health management needs  Sources of information:   Information for this evaluation was gathered through direct interview with the patient  Additionally EMR was reviewed  Confidentiality discussion: Limits of confidentiality in cases of safety concerns involving self and others as well as this physician's role as a mandatory  of abuse  They voiced understanding and a desire to continue with the evaluation  SUBJECTIVE     Chief Complaint / reason for visit: " not doing well since my daughter "    History of Present Illness:   Jhoana William is a  53year old female who presented today for psychiatric evaluation  Her PCP has been managing her antidepressant medication  Jhoana William reports that she has experienced depression and anxiety "on and off for years", she believes that she had depression that started in her childhood years  She reports that her mental health symptoms exacerbated after the death of her 25year old daughter last summer, 2020  She stated that autopsy revealed that her daughter had myocarditis possibly viral in nature  Jhoana William denies any suicidal or homicidal ideation  She reported she has only been suicidal once in the past after being prescribed Hannah for her pain symptoms  Jhoana William reports that taking Zoloft has been helpful, but she is still experiencing depressive and anxiety symptoms  She reports that she was experiencing panic attacks after her daughters death, but has not had one since 2021  She reports tearful episodes that are improving  She reports a month ago she would have tearful episodes on the average of four times daily and is now experiencing these episodes once a week   She did become tearful a few times during the interview  Mila Fu reports sleep varies, no difficulty falling asleep, but does state that she has trouble staying  asleep and will wake up several times a night  She reports racing thoughts, feelings of guilt and hopelessness, decreased energy, fatigue and  anhedonia (but did state that she does attempt to garden)  Mila Fu also reports some irritability at times and  difficulty with focus and concentration  She does report having a good support system with family and friends  Stressors: death of daughter    HPI ROS:  Medication Side Effects: denies  Depression: 8/10 /10 (10 worst)  Anxiety: 8/10 /10 (10 worst)  Safety (SI, HI, other): denies  Sleep: difficulty staying asleep  Energy: low  Appetite: good  Weight Change: none reported    In terms of depression, the patient endorses depressed mood, change in sleep, loss of energy, thoughts of worthlessness or guilt, trouble concentrating  In terms of bipolar disorder, the patient endorses no  Symptoms include no symptoms    ELVA symptoms: excessive worry more days than not for longer than 3 months, difficulty concentrating, fatigue, insomnia, irritable and muscle tightness  Panic Disorder symptoms: palpitations/racing heart, sweating, shortness of breath, nausea/GI distress, dizzy/light headed, fear of losing control  Social Anxiety symptoms: social anxiety due to fear of judgment or embarassment  OCD Symptoms: No significant symptoms supportive of OCD  Eating Disorder symptoms: no historical or current eating disorder  no binge eating disorder; no anorexia nervosa  no symptoms of bulimia  ADHD symptoms: none reported     In terms of PTSD, the patient endorses exposure to trauma involving: denies trauma  Daughter  suddenly 2020    intrusive symptoms including (1+): no intrusive symptoms; avoidance symptoms including (1+): will avoid looking up in living room where daughter's urn is kept Negative alterations including (2+): no negative alteration symptoms; hyperarousal symptoms including (2+): 15- irritability/angry outbursts, 19- problems with concentration, 20- sleep disturbancance    In terms of psychotic symptoms, the patient reports no psychotic symptoms now or in the past     Past Psychiatric History  Previous diagnoses include depression    Prior outpatient psychiatric treatment: only therapy and is currently engaged    Prior therapy: currently engaged in counseling and reports it being helpful  Preston Sosa also reports that she and her  have tried marriage counseling in the past "not helpful"      Prior inpatient psychiatric treatment: denies    Prior suicide attempts: denies    Prior self harm: denies    Prior violence or aggression: denies    Social History: Lives with  and 21year old son  She works as an  at [employer]  She graduated [employer] and has taken some college courses for accounting  Has a pet cat  Her 29year old daughter lives on her own  The patient grew up in [place]   Childhood was described as "sucked, both parents worked"  She stated that she was not close with sisters growing up, but now have a better relationship  She stated that parents yelled a lot and she and her sisters would fight  During childhood, parents were   They have 2 sister(s) and no brother(s)  Patient is youngest in birth order    Abuse/neglect: none    As far as the patient (or present family member) is aware, overall childhood development: Patient does ascribe to normal developmental milestones such as walking, talking, potty training and making childhood friends      Social History     Socioeconomic History    Marital status: /Civil Union     Spouse name: None    Number of children: None    Years of education: None    Highest education level: None   Occupational History    None   Social Needs    Financial resource strain: None    Food insecurity     Worry: None     Inability: None    Transportation needs     Medical: None     Non-medical: None   Tobacco Use    Smoking status: Never Smoker    Smokeless tobacco: Never Used    Tobacco comment: Per [employer]; Former smoker   Substance and Sexual Activity    Alcohol use: No    Drug use: No    Sexual activity: None   Lifestyle    Physical activity     Days per week: None     Minutes per session: None    Stress: None   Relationships    Social connections     Talks on phone: None     Gets together: None     Attends Latter-day service: None     Active member of club or organization: None     Attends meetings of clubs or organizations: None     Relationship status: None    Intimate partner violence     Fear of current or ex partner: None     Emotionally abused: None     Physically abused: None     Forced sexual activity: None   Other Topics Concern    None   Social History Narrative    None         B[employer]ism Affiliation: [address] [place] [address]  experience: denies  Legal history: denies  Access to Guns: denies    Endorses previous experimentation with: "dabbled in cocaine in the past when young, last use was 30 years ago"   Narendra De Los Santos does report having a medical marijuana card and vapes 5 times a week     Longest clean time: not applicable  History of Inpatient/Outpatient rehabilitation program: no   Former smoker  Drinks one cup of caffeine daily, denies energy drink use       Traumatic History:     Abuse: none  Other Traumatic Events: death of daughter     Family Psychiatric History:     Psychiatric Illness:  Grandmother - depression, maternal aunt and cousin depression,   Substance Abuse:  no family history of substance abuse  Suicide Attempts:  no family history of suicide attempts       Family History   Problem Relation Age of Onset    Ovarian cancer Sister             Past Medical / Surgical History:    Current PCP: BAKARI Shaw   Other providers include: Treatment Team: Nurse Practitioner: BAKARI Woods     Patient Active Problem List   Diagnosis    Class 3 severe obesity due to excess calories without serious comorbidity with body mass index (BMI) of 40 0 to 44 9 in adult [facility])    Degenerative disc disease, cervical    Cervical herniated disc    Fibromyalgia    Lymphadenopathy of head and neck    Depression with anxiety    Grief at loss of child       Past Medical History-  Past Medical History:   Diagnosis Date    Carpal tunnel syndrome     Fibromyalgia     Lupus ([address] )     Spinal stenosis         History of Seizures: no  History of Head injury-LOC-Concussion: no    Past Surgical History-  Past Surgical History:   Procedure Laterality Date     SECTION      KNEE SURGERY      Knee Arthroscopy (Therapeutic)          Allergies: Allergies   Allergen Reactions    Epinephrine     Lidocaine Hives    Moxifloxacin Hives     avelox       Recent labs:  No visits with results within 1 Month(s) from this visit     Latest known visit with results is:   Appointment on 10/20/2018   Component Date Value    Sodium 10/20/2018 138     Potassium 10/20/2018 4 2     Chloride 10/20/2018 106     CO2 10/20/2018 27     ANION GAP 10/20/2018 5     BUN 10/20/2018 11     Creatinine 10/20/2018 0 57*    Glucose, Fasting 10/20/2018 79     Calcium 10/20/2018 10 2*    Corrected Calcium 10/20/2018 10 4*    AST 10/20/2018 18     ALT 10/20/2018 37     Alkaline Phosphatase 10/20/2018 56     Total Protein 10/20/2018 6 9     Albumin 10/20/2018 3 8     Total Bilirubin 10/20/2018 0 59     eGFR 10/20/2018 111     Cholesterol 10/20/2018 178     Triglycerides 10/20/2018 114     HDL, Direct 10/20/2018 50     LDL Calculated 10/20/2018 105*    Non-HDL-Chol (CHOL-HDL) 10/20/2018 128     WBC 10/20/2018 5 83     RBC 10/20/2018 4 26     Hemoglobin 10/20/2018 12 2     Hematocrit 10/20/2018 39 5     MCV 10/20/2018 93     MCH 10/20/2018 28 6     MCHC 10/20/2018 30 9*    RDW 10/20/2018 12 9     MPV 10/20/2018 10 8     Platelets 10/20/2018 341     nRBC 10/20/2018 0     Neutrophils Relative 10/20/2018 46     Immat GRANS % 10/20/2018 0     Lymphocytes Relative 10/20/2018 41     Monocytes Relative 10/20/2018 10     Eosinophils Relative 10/20/2018 2     Basophils Relative 10/20/2018 1     Neutrophils Absolute 10/20/2018 2 67     Immature Grans Absolute 10/20/2018 0 02     Lymphocytes Absolute 10/20/2018 2 39     Monocytes Absolute 10/20/2018 0 58     Eosinophils Absolute 10/20/2018 0 10     Basophils Absolute 10/20/2018 0 07      Labs in clinical recordwere reviewed    Medical Review Of Systems:    Patient admits to DJD, fibromyalgia, ; otherwise A comprehensive review of systems was negative  Medications:  Current Outpatient Medications on File Prior to Visit   Medication Sig Dispense Refill    gabapentin (NEURONTIN) 300 mg capsule Take 300 mg by mouth 3 (three) times a day Only takes 200 mg once a day at night  1    meloxicam (MOBIC) 15 mg tablet TAKE 1 TABLET BY MOUTH EVERY DAY AS NEEDED (NOT DAILY USE)  2    morphine (MS CONTIN) 15 mg 12 hr tablet 15 mg every 12 (twelve) hours  0    oxyCODONE (ROXICODONE) 5 mg immediate release tablet Take 5 mg by mouth 2 (two) times a day as needed  0    [DISCONTINUED] sertraline (ZOLOFT) 50 mg tablet Take 1 tablet (50 mg total) by mouth daily 30 tablet 5     No current facility-administered medications on file prior to visit  Medication Compliant? Yes but stated that she only takes Gabapentin 200 mg po HS because it "makes her tired"    All current active medications have been reviewed  Objective     OBJECTIVE     There were no vitals taken for this visit      MENTAL STATUS EXAM  Appearance:  age appropriate, dressed casually   Behavior:  Pleasant & cooperative, tearful at times   Speech:  Normal volume, regular rate and rhythm   Mood:  depressed and anxious   Affect:  mood congruent   Language: intact and appropriate for age, education, and intellect   Thought Process: Linear and goal directed   Associations: intact associations   Thought Content:  normal and appropriate   Perceptual Disturbances: no auditory or visual hallcunations   Risk Potential / Abnormal Thoughts: Suicidal ideation - None  Homicidal ideation - None  Potential for aggression - No       Consciousness:  Alert & Awake   Sensorium:  Fully oriented to person, place, time/date   Attention: attention span and concentration are age appropriate   Intellect: intact   Fund of Knowledge:  Memory: awareness of current events: yes  recent and remote memory grossly intact   Insight:  fair   Judgment: fair   Muscle Strength Muscle Tone: normal  normal   Gait/Station: normal gait/station with good balance   Motor Activity: no abnormal movements     Pain moderate   Pain Scale 6     IMPRESSIONS/FORMULATION          Diagnoses and all orders for this visit:    Depression with anxiety  -     sertraline (ZOLOFT) 50 mg tablet; Take 1 5 tablets (75 mg total) by mouth daily        1  Depression with anxiety          Vikas Wright is a 53 y o  female who presents with symptoms supporting the aforementioned  Suicide / Homicide / Safety risk assessment: At this time Vikas Wright is at low risk for harm of self or others  Plan:       Education about diagnosis and treatment modalities, patient voiced understanding and agreement with the following plan:    Discussed medication risks, beneftis, alternatives  Patient was informed and had time to ask questions  They agreed to treatment below    Controlled Medication Discussion:     Patient is prescribed opioid medication for chronic pain by a medical provider  Initial treatment plan:   1) MEDS:    - Will increase Zoloft to 75 mg po daily for depressive and anxiety symptoms   Will titrate slow due to reported past GI symptoms with initiation of Zoloft  We did discuss trialing Hydroxyzine on as needed basis for anxiety or insomnia, but Wayin declined 2) Labs: none ordered at today's visit    3) Therapy:    - [facility] is engaged in therapy    4) Medical:    - Pt will f/u with other providers as needed    5) Other: Support as needed   - Georgie's uncle is a psychologist and she talks to him frequently     6) Follow up:   - Follow up in 6 weeks or sooner if needed   - Patient will call if issues or concerns     7) Treatment Plan:    - Enacted 6/1/2021 by Shania Hodge     Discussed self monitoring of symptoms, and symptom monitoring tools  Patient has been informed of 24 hours and weekend coverage for urgent situations accessed by calling the main clinic phone number

## 2021-06-24 ENCOUNTER — TELEPHONE (OUTPATIENT)
Dept: PSYCHIATRY | Facility: CLINIC | Age: 51
End: 2021-06-24

## 2021-08-02 ENCOUNTER — OFFICE VISIT (OUTPATIENT)
Dept: URGENT CARE | Facility: CLINIC | Age: 51
End: 2021-08-02
Payer: COMMERCIAL

## 2021-08-02 VITALS
HEART RATE: 77 BPM | TEMPERATURE: 97.4 F | RESPIRATION RATE: 18 BRPM | BODY MASS INDEX: 41.78 KG/M2 | HEIGHT: 66 IN | OXYGEN SATURATION: 99 % | WEIGHT: 260 LBS

## 2021-08-02 DIAGNOSIS — H00.014 HORDEOLUM EXTERNUM LEFT UPPER EYELID: Primary | ICD-10-CM

## 2021-08-02 PROCEDURE — 99213 OFFICE O/P EST LOW 20 MIN: CPT | Performed by: EMERGENCY MEDICINE

## 2021-08-02 RX ORDER — ERYTHROMYCIN 5 MG/G
0.5 OINTMENT OPHTHALMIC EVERY 6 HOURS SCHEDULED
Qty: 20 G | Refills: 0 | Status: SHIPPED | OUTPATIENT
Start: 2021-08-02 | End: 2021-08-07

## 2021-08-02 NOTE — PATIENT INSTRUCTIONS
Follow up with PCP in 3-5 days or an opthamologist    If symptoms worsen, proceed to the ER                                                                                                                 Stye   WHAT YOU NEED TO KNOW:   A stye is a lump on the edge or inside of your eyelid caused by an infection  A stye can form on your upper or lower eyelid  It usually goes away in 2 to 4 days  DISCHARGE INSTRUCTIONS:   Medicines:   · Antibiotic medicine: This is given as an ointment to put into your eye  It is used to fight an infection caused by bacteria  Use as directed  · Take your medicine as directed  Contact your healthcare provider if you think your medicine is not helping or if you have side effects  Tell him of her if you are allergic to any medicine  Keep a list of the medicines, vitamins, and herbs you take  Include the amounts, and when and why you take them  Bring the list or the pill bottles to follow-up visits  Carry your medicine list with you in case of an emergency  Follow up with your healthcare provider as directed:  Write down your questions so you remember to ask them during your visits  Self-care:   · Use warm compresses: This will help decrease swelling and pain  Wet a clean washcloth with warm water and place it on your eye for 10 to 15 minutes, 3 to 4 times each day or as directed  · Keep your hands away from your eye: This helps to prevent the spread of the infection to other parts of the eye  Wash your hands often with soap and dry with a clean towel  Do not squeeze the stye  · Do not use eye makeup:  Do not wear eye makeup while you have a stye  Eye makeup may carry bacteria and cause another stye  Throw away eye makeup and brushes used to apply the makeup  Use new eye makeup after the stye has gone away  Do not share eye makeup with others  · Prevent another stye:  Wash your face and clean your eyelashes every day  Remove eye makeup with makeup remover   This helps to completely remove eye makeup without heavy rubbing  Contact your healthcare provider if:   · You have redness and discharge around your eye, and your eye pain is getting worse  · Your vision changes  · The stye has not gone away within 7 days  · The stye comes back within a short period of time after treatment  · You have questions or concerns about your condition or care  © Copyright [employer] 2021 Information is for End User's use only and may not be sold, redistributed or otherwise used for commercial purposes  All illustrations and images included in CareNotes® are the copyrighted property of [employer]  or [employer]  The above information is an  only  It is not intended as medical advice for individual conditions or treatments  Talk to your doctor, nurse or pharmacist before following any medical regimen to see if it is safe and effective for you

## 2021-08-02 NOTE — PROGRESS NOTES
[facility]        NAME: Imani Horne is a 53 y o  female  : 1970    MRN: [id number]  DATE: 2021  TIME: 11:36 AM    Assessment and Plan   Hordeolum externum left upper eyelid [H00 [id number]]  1  Hordeolum externum left upper eyelid  erythromycin (ILOTYCIN) ophthalmic ointment     Follow up with PCP in 3-5 days or an opthamologist    If symptoms worsen, proceed to the ER    Patient Instructions     Patient Instructions   Follow up with PCP in 3-5 days or an opthamologist    If symptoms worsen, proceed to the ER                                                                                                                 Stye   WHAT YOU NEED TO KNOW:   A stye is a lump on the edge or inside of your eyelid caused by an infection  A stye can form on your upper or lower eyelid  It usually goes away in 2 to 4 days  DISCHARGE INSTRUCTIONS:   Medicines:   · Antibiotic medicine: This is given as an ointment to put into your eye  It is used to fight an infection caused by bacteria  Use as directed  · Take your medicine as directed  Contact your healthcare provider if you think your medicine is not helping or if you have side effects  Tell him of her if you are allergic to any medicine  Keep a list of the medicines, vitamins, and herbs you take  Include the amounts, and when and why you take them  Bring the list or the pill bottles to follow-up visits  Carry your medicine list with you in case of an emergency  Follow up with your healthcare provider as directed:  Write down your questions so you remember to ask them during your visits  Self-care:   · Use warm compresses: This will help decrease swelling and pain  Wet a clean washcloth with warm water and place it on your eye for 10 to 15 minutes, 3 to 4 times each day or as directed  · Keep your hands away from your eye: This helps to prevent the spread of the infection to other parts of the eye   Wash your hands often with soap and dry with a clean towel  Do not squeeze the stye  · Do not use eye makeup:  Do not wear eye makeup while you have a stye  Eye makeup may carry bacteria and cause another stye  Throw away eye makeup and brushes used to apply the makeup  Use new eye makeup after the stye has gone away  Do not share eye makeup with others  · Prevent another stye:  Wash your face and clean your eyelashes every day  Remove eye makeup with makeup remover  This helps to completely remove eye makeup without heavy rubbing  Contact your healthcare provider if:   · You have redness and discharge around your eye, and your eye pain is getting worse  · Your vision changes  · The stye has not gone away within 7 days  · The stye comes back within a short period of time after treatment  · You have questions or concerns about your condition or care  © Copyright [employer] 2021 Information is for End User's use only and may not be sold, redistributed or otherwise used for commercial purposes  All illustrations and images included in CareNotes® are the copyrighted property of A D A M , Inc  or [facility] [place]   The above information is an  only  It is not intended as medical advice for individual conditions or treatments  Talk to your doctor, nurse or pharmacist before following any medical regimen to see if it is safe and effective for you  Follow up with PCP in 3-5 days or an opthamologist      Chief Complaint     Chief Complaint   Patient presents with    Eye Pain     L eye pain on her upper eyelid that burns and is red  History of Present Illness       23-year-old white female with a chief complaint of swelling of her left upper eyelid since Saturday  Patient states has and itching she has been rubbing and now it has turned purple  Patient also complained of some difficulty opening her eye with a discharge present    Patient complains of some blurred vision as well and states that she needs glasses but does not wear them  Patient denies any trauma or insect bite  Review of Systems   Review of Systems   Constitutional: Negative for chills and fever  HENT: Negative for congestion and rhinorrhea  Eyes: Positive for pain, discharge, itching and visual disturbance  Respiratory: Negative for shortness of breath and wheezing  Cardiovascular: Negative for chest pain and palpitations  Gastrointestinal: Negative for abdominal pain and vomiting  Endocrine: Negative for polydipsia and polyuria  Genitourinary: Negative for dysuria and hematuria  Musculoskeletal: Negative for arthralgias, gait problem and neck stiffness  Skin: Negative for rash and wound  Neurological: Negative for dizziness and headaches  Psychiatric/Behavioral: Negative for confusion and suicidal ideas           Current Medications       Current Outpatient Medications:     gabapentin (NEURONTIN) 300 mg capsule, Take 300 mg by mouth 3 (three) times a day Only takes 200 mg once a day at night, Disp: , Rfl: 1    meloxicam (MOBIC) 15 mg tablet, TAKE 1 TABLET BY MOUTH EVERY DAY AS NEEDED (NOT DAILY USE), Disp: , Rfl: 2    morphine (MS CONTIN) 15 mg 12 hr tablet, 15 mg every 12 (twelve) hours, Disp: , Rfl: 0    oxyCODONE (ROXICODONE) 5 mg immediate release tablet, Take 5 mg by mouth 2 (two) times a day as needed, Disp: , Rfl: 0    sertraline (ZOLOFT) 50 mg tablet, Take 1 5 tablets (75 mg total) by mouth daily, Disp: 45 tablet, Rfl: 1    erythromycin (ILOTYCIN) ophthalmic ointment, Administer 0 5 inches into the left eye every 6 (six) hours for 5 days, Disp: 20 g, Rfl: 0    Current Allergies     Allergies as of 08/02/2021 - Reviewed 08/02/2021   Allergen Reaction Noted    Epinephrine  08/09/2016    Lidocaine Hives 05/06/2018    Moxifloxacin Hives             The following portions of the patient's history were reviewed and updated as appropriate: allergies, current medications, past family history, past medical history, past social history, past surgical history and problem list      Past Medical History:   Diagnosis Date    Carpal tunnel syndrome     Fibromyalgia     Lupus ([address] )     Spinal stenosis        Past Surgical History:   Procedure Laterality Date     SECTION      KNEE SURGERY      Knee Arthroscopy (Therapeutic)       Family History   Problem Relation Age of Onset    Ovarian cancer Sister          Medications have been verified  Objective   Pulse 77   Temp (!) 97 4 °F (36 3 °C) (Temporal)   Resp 18   Ht 5' 6" (1 676 m)   Wt 118 kg (260 lb)   SpO2 99%   BMI 41 97 kg/m²        Physical Exam     Physical Exam  Vitals and nursing note reviewed  Constitutional:       Appearance: Normal appearance  Comments: 43-year-old white female sitting the stretcher with the ecchymotic left eyelid  HENT:      Head: Normocephalic and atraumatic  Comments: Left eye: There is ecchymosis the left upper eyelid with an anion abrasion to the medial aspect the left upper eyelid  There is mild conjunctivitis  Pupils are equal and reactive to light and accommodation  There is mild edema consistent with a corneal as well of the left upper eyelid in the medial aspect  Patient does admit to itching the eye  Eyes:      Extraocular Movements: Extraocular movements intact  Cardiovascular:      Rate and Rhythm: Normal rate  Pulmonary:      Effort: Pulmonary effort is normal    Musculoskeletal:      Cervical back: Normal range of motion  Skin:     General: Skin is warm and dry  Neurological:      Mental Status: She is alert     Psychiatric:         Mood and Affect: Mood normal        Visual acuities were 20 /40 both eyes 20/ 70 OD and 20 /40 OS

## 2021-08-10 ENCOUNTER — OFFICE VISIT (OUTPATIENT)
Dept: PSYCHIATRY | Facility: CLINIC | Age: 51
End: 2021-08-10
Payer: COMMERCIAL

## 2021-08-10 DIAGNOSIS — F41.8 DEPRESSION WITH ANXIETY: ICD-10-CM

## 2021-08-10 PROCEDURE — 99213 OFFICE O/P EST LOW 20 MIN: CPT | Performed by: REGISTERED NURSE

## 2021-08-10 NOTE — PSYCH
MEDICATION MANAGEMENT NOTE        [facility]    Name and Date of Birth:  Malgorzata Talley 53 y o  1970 MRN: [id number]    Date of Visit: August 10, 2021    Allergies   Allergen Reactions    Epinephrine     Lidocaine Hives    Moxifloxacin Hives     avelox     SUBJECTIVE:    Telma Barreto is seen today for a follow up for depression  She continues to do fairly well since the last visit on 6/1/20201  She reports that she feels that the increase in Zoloft has been helpful  She rates her depression 5/10 and anxiety 3/10  She reports a decrease in panic attacks and only reports two since her last visit  She does still endorse low energy  She reports sleep is "ok", but has some awakenings at night (able to fall back asleep)  She declined any medication for sleep  Telma Barreto reports that she is happy that she is starting a new job and is hopeful that it will be better than her last job  She denies any suicidal or homicidal ideation  She denies any psychosis or hallucinations  Telma Barreto was in agreement to stay on her current medication regimen and did not want any changes  She denies any alcohol use and reports that she has one vape at night of medical marijuana for insomnia  She denies any side effects from medications  PLAN:  Follow up in 2 months or sooner if needed  Continue Zoloft 75 mg po daily  Continue therapy   Aware of 24 hour and weekend coverage for urgent situations accessed by calling [facility] main practice number  Continue psychotherapy with therapist    Diagnoses and all orders for this visit:    Depression with anxiety  -     sertraline (ZOLOFT) 50 mg tablet;  Take 1 5 tablets (75 mg total) by mouth daily        Current Outpatient Medications on File Prior to Visit   Medication Sig Dispense Refill    gabapentin (NEURONTIN) 300 mg capsule Take 300 mg by mouth 3 (three) times a day Only takes 200 mg once a day at night  1    meloxicam (MOBIC) 15 mg tablet TAKE 1 TABLET BY MOUTH EVERY DAY AS NEEDED (NOT DAILY USE)  2    morphine (MS CONTIN) 15 mg 12 hr tablet 15 mg every 12 (twelve) hours  0    oxyCODONE (ROXICODONE) 5 mg immediate release tablet Take 5 mg by mouth 2 (two) times a day as needed  0    [DISCONTINUED] sertraline (ZOLOFT) 50 mg tablet Take 1 5 tablets (75 mg total) by mouth daily 45 tablet 1     No current facility-administered medications on file prior to visit  Psychotherapy Provided:     Individual psychotherapy provided: No     HPI ROS Appetite Changes and Sleep:     She reports wakes up several times a night but able to fall back to sleep, normal appetite, low energy   Patient denies suicidal or homicidal ideation    Review Of Systems:      HPI ROS:               Medication Side Effects:  denies     Depression (10 worst): 5/10    Anxiety (10 worst): 3/10    Safety concerns (SI, HI, etc): denies    Sleep: Awakenings at night    Energy: low    Appetite: good    Weight Change: None reported      General sleep disturbances   Personality no change in personality   Constitutional negative   ENT negative   Cardiovascular negative   Respiratory negative   Gastrointestinal negative   Genitourinary negative   Musculoskeletal as noted in HPI   Integumentary negative   Neurological negative   Endocrine negative   Other Symptoms none, all other systems are negative     Mental Status Evaluation:    Appearance Adequate hygiene and grooming and Good eye contact   Behavior calm and cooperative   Mood depressed  Depression Scale - 5 of 10 (0 = No depression)  Anxiety Scale - 3 of 10 (0 = No anxiety)   Speech Normal rate and volume   Affect mood-congruent   Thought Processes Goal directed and coherent   Thought Content Does not verbalize delusional material   Associations Tightly connected   Perceptual Disturbances Denies hallucinations and does not appear to be responding to internal stimuli   Risk Potential Suicidal/Homicidal Ideation - No evidence of suicidal or homicidal ideation and patient does not verbalize suicidal or homicidal ideation  Risk of Violence - No evidence of risk for violence found on assessment  Risk of Self Mutilation - No evidence of risk for self mutilation found on assessment   Orientation oriented to person, place, time/date and situation   Memory recent and remote memory grossly intact   Consciousness alert and awake   Attention/Concentration attention span and concentration are age appropriate   Insight intact   Judgement intact   Muscle Strength and Gait normal muscle strength and normal muscle tone, normal gait/station and normal balance   Motor Activity no abnormal movements   Language no difficulty naming common objects, no difficulty repeating a phrase, no difficulty writing a sentence   Fund of Knowledge adequate knowledge of current events  adequate fund of knowledge regarding past history  adequate fund of knowledge regarding vocabulary      Past Psychiatric History Update:     Inpatient Psychiatric Admission Since Last Encounter:   no  Changes to Outpatient Psychiatric Treatment Team:    no  Suicide Attempt Or Self Mutilation Since Last Encounter:   no  Incidence of Violent Behavior Since Last Encounter:   no    Traumatic History Update:     New Onset of Abuse Since Last Encounter:   no  Traumatic Events Since Last Encounter:   no    Past Medical History:    Past Medical History:   Diagnosis Date    Carpal tunnel syndrome     Fibromyalgia     [address] ([facility] [address]     Spinal stenosis      No past medical history pertinent negatives    Past Surgical History:   Procedure Laterality Date     SECTION      KNEE SURGERY      Knee Arthroscopy (Therapeutic)     Allergies   Allergen Reactions    Epinephrine     Lidocaine Hives    Moxifloxacin Hives     avelox     Substance Abuse History:    Social History     Substance and Sexual Activity   Alcohol Use No     Social History     Substance and Sexual Activity   Drug Use No     Social History:    Social History     Socioeconomic History    Marital status: /Civil Union     Spouse name: Not on file    Number of children: Not on file    Years of education: Not on file    Highest education level: Not on file   Occupational History    Not on file   Tobacco Use    Smoking status: Never Smoker    Smokeless tobacco: Never Used    Tobacco comment: Per [employer]; Former smoker   Substance and Sexual Activity    Alcohol use: No    Drug use: No    Sexual activity: Not on file   Other Topics Concern    Not on file   Social History Narrative    Not on file     Social Determinants of Health     Financial Resource Strain:     Difficulty of Paying Living Expenses:    Food Insecurity:     Worried About Running Out of Food in the Last Year:     [address] in the Last Year:    Transportation Needs:     Lack of Transportation (Medical):  Lack of Transportation (Non-Medical):    Physical Activity:     Days of Exercise per Week:     Minutes of Exercise per Session:    Stress:     Feeling of Stress :    Social Connections:     Frequency of Communication with Friends and Family:     Frequency of Social Gatherings with Friends and Family:     Attends Gnosticism Services:     Active Member of Clubs or Organizations:     Attends Club or Organization Meetings:     Marital Status:    Intimate Partner Violence:     Fear of Current or Ex-Partner:     Emotionally Abused:     Physically Abused:     Sexually Abused:      Family Psychiatric History:     Family History   Problem Relation Age of Onset    Ovarian cancer Sister      History Review: The following portions of the patient's history were reviewed and updated as appropriate: allergies, current medications, past family history, past medical history, past social history, past surgical history and problem list     OBJECTIVE:     Vital signs in last 24 hours:     There were no vitals filed for this visit  Laboratory Results: I have personally reviewed all pertinent laboratory/tests results  Suicide/Homicide Risk Assessment:    Risk of Harm to Self:  Based on today's assessment, Loreto Pratt presents the following risk of harm to self: low    Risk of Harm to Others:  Based on today's assessment, Loreto Pratt presents the following risk of harm to others: none    The following interventions are recommended: no intervention changes needed    Medications Risks/Benefits:      Risks, Benefits And Possible Side Effects Of Medications:    Discussed risks and benefits of treatment with patient including risk of suicidality, serotonin syndrome and SIADH related to treatment with antidepressants; Risk of induction of manic symptoms in certain patient populations     Controlled Medication Discussion:     Loreto Pratt has been filling controlled prescriptions on time as prescribed according to [facility]    Treatment Plan:    Due for update/Updated:   no  Last treatment plan done [id number] by 6/1/2021  Treatment Plan due on 12/1/2021  Kristan Dance, CRNP 08/10/21    This note was shared with patient

## 2021-09-14 DIAGNOSIS — F41.8 DEPRESSION WITH ANXIETY: ICD-10-CM

## 2021-09-28 DIAGNOSIS — F41.8 DEPRESSION WITH ANXIETY: ICD-10-CM

## 2021-10-11 ENCOUNTER — OFFICE VISIT (OUTPATIENT)
Dept: PSYCHIATRY | Facility: CLINIC | Age: 51
End: 2021-10-11
Payer: COMMERCIAL

## 2021-10-11 DIAGNOSIS — F41.8 DEPRESSION WITH ANXIETY: Primary | ICD-10-CM

## 2021-10-11 PROCEDURE — 99213 OFFICE O/P EST LOW 20 MIN: CPT

## 2021-11-24 ENCOUNTER — TELEPHONE (OUTPATIENT)
Dept: PSYCHIATRY | Facility: CLINIC | Age: 51
End: 2021-11-24

## 2021-12-01 DIAGNOSIS — F41.8 DEPRESSION WITH ANXIETY: ICD-10-CM

## 2021-12-20 ENCOUNTER — OFFICE VISIT (OUTPATIENT)
Dept: PSYCHIATRY | Facility: CLINIC | Age: 51
End: 2021-12-20
Payer: COMMERCIAL

## 2021-12-20 DIAGNOSIS — F41.1 GENERALIZED ANXIETY DISORDER: ICD-10-CM

## 2021-12-20 DIAGNOSIS — F41.8 DEPRESSION WITH ANXIETY: Primary | ICD-10-CM

## 2021-12-20 PROCEDURE — 99213 OFFICE O/P EST LOW 20 MIN: CPT

## 2021-12-20 PROCEDURE — 1036F TOBACCO NON-USER: CPT

## 2021-12-20 RX ORDER — HYDROXYZINE HYDROCHLORIDE 25 MG/1
25 TABLET, FILM COATED ORAL EVERY 6 HOURS PRN
Qty: 90 TABLET | Refills: 0 | Status: SHIPPED | OUTPATIENT
Start: 2021-12-20

## 2022-02-11 ENCOUNTER — TELEPHONE (OUTPATIENT)
Dept: PSYCHIATRY | Facility: CLINIC | Age: 52
End: 2022-02-11

## 2022-02-11 DIAGNOSIS — F41.8 DEPRESSION WITH ANXIETY: ICD-10-CM

## 2022-02-21 ENCOUNTER — OFFICE VISIT (OUTPATIENT)
Dept: PSYCHIATRY | Facility: CLINIC | Age: 52
End: 2022-02-21
Payer: COMMERCIAL

## 2022-02-21 DIAGNOSIS — F41.8 DEPRESSION WITH ANXIETY: Primary | ICD-10-CM

## 2022-02-21 PROCEDURE — 1036F TOBACCO NON-USER: CPT

## 2022-02-21 PROCEDURE — 99213 OFFICE O/P EST LOW 20 MIN: CPT

## 2022-02-21 NOTE — PSYCH
Regular Visit    Problem List Items Addressed This Visit        Other    Depression with anxiety - Primary             Encounter provider BAKARI Jaquez    Provider located at   [address] [address] [phone]    Recent Visits  No visits were found meeting these conditions  Showing recent visits within past 7 days and meeting all other requirements  Today's Visits  Date Type Provider Dept   02/21/22 Office Visit BAKARI Jaquez  [facility]   Showing today's visits and meeting all other requirements  Future Appointments  No visits were found meeting these conditions  Showing future appointments within next 150 days and meeting all other requirements       HPI     Current Outpatient Medications   Medication Sig Dispense Refill    gabapentin (NEURONTIN) 300 mg capsule Take 300 mg by mouth 3 (three) times a day Only takes 200 mg once a day at night  1    hydrOXYzine HCL (ATARAX) 25 mg tablet Take 1 tablet (25 mg total) by mouth every 6 (six) hours as needed for anxiety 90 tablet 0    meloxicam (MOBIC) 15 mg tablet TAKE 1 TABLET BY MOUTH EVERY DAY AS NEEDED (NOT DAILY USE)  2    morphine (MS CONTIN) 15 mg 12 hr tablet 15 mg every 12 (twelve) hours  0    oxyCODONE (ROXICODONE) 5 mg immediate release tablet Take 5 mg by mouth 2 (two) times a day as needed  0    sertraline (Zoloft) 50 mg tablet Take 2 5 tablets (125 mg total) by mouth daily 90 tablet 0     No current facility-administered medications for this visit         Review of Systems        MEDICATION MANAGEMENT NOTE        [facility]    Name and Date of Birth:  Intammie Márquez 49 y o  1970 MRN: [id number]    Date of Visit: February 21, 2022    Allergies   Allergen Reactions    Epinephrine     Lidocaine Hives    Moxifloxacin Hives     avelox     SUBJECTIVE:    Penny Van is seen today for a follow up for depression and anxiety  She continues to do relatively well since the last visit  Patient reports the Zoloft and Atarax are doing a good job controlling her anxiety and depression symptoms and she declines any increase or change in medication at this time  She denies all side effects from medications  Reported a few episodes of random anxiety attacks which she has no idea the cause of these attacks but the as needed Atarax was able to decrease her anxiety and return to normal level of function  Otherwise, no change in her mental profile and she wants to continue the current medication regimen  She continues to follow-up with her private therapist and offered her therapy at [address] [place] [address] if she ever decides she needs it  Patient denies suicidal ideation  She denies any side effects from medications      PLAN:    Continue Zoloft 125mg daily  Continue Atarax 25mg as needed for anxiety      Aware of 24 hour and weekend coverage for urgent situations accessed by calling [facility] main practice number  Continue psychotherapy with therapist    Diagnoses and all orders for this visit:    Depression with anxiety        Current Outpatient Medications on File Prior to Visit   Medication Sig Dispense Refill    gabapentin (NEURONTIN) 300 mg capsule Take 300 mg by mouth 3 (three) times a day Only takes 200 mg once a day at night  1    hydrOXYzine HCL (ATARAX) 25 mg tablet Take 1 tablet (25 mg total) by mouth every 6 (six) hours as needed for anxiety 90 tablet 0    meloxicam (MOBIC) 15 mg tablet TAKE 1 TABLET BY MOUTH EVERY DAY AS NEEDED (NOT DAILY USE)  2    morphine (MS CONTIN) 15 mg 12 hr tablet 15 mg every 12 (twelve) hours  0    oxyCODONE (ROXICODONE) 5 mg immediate release tablet Take 5 mg by mouth 2 (two) times a day as needed  0    sertraline (Zoloft) 50 mg tablet Take 2 5 tablets (125 mg total) by mouth daily 90 tablet 0     No current facility-administered medications on file prior to visit  HPI ROS Appetite Changes and Sleep:     She reports normal sleep, adequate appetite, adequate energy level   Denies homicidal ideation, denies suicidal ideation    Review Of Systems:      HPI ROS:               Medication Side Effects:  denies    Depression (10 worst): 5/10    Anxiety (10 worst): 5/10    Safety concerns (SI, HI, etc): Denies si    Sleep: fair    Energy: fair    Appetite: good    Weight Change: denies        Mental Status Evaluation:    Appearance Adequate hygiene and grooming   Behavior calm and cooperative   Mood depressed  Depression Scale - 5 of 10 (0 = No depression)  Anxiety Scale - 5 of 10 (0 = No anxiety)   Speech Normal rate and volume   Affect appropriate   Thought Processes Goal directed and coherent   Thought Content Does not verbalize delusional material   Associations Tightly connected   Perceptual Disturbances Denies hallucinations and does not appear to be responding to internal stimuli   Risk Potential Suicidal/Homicidal Ideation - No evidence of suicidal or homicidal ideation and patient does not verbalize suicidal or homicidal ideation  Risk of Violence - No evidence of risk for violence found on assessment  Risk of Self Mutilation - No evidence of risk for self mutilation found on assessment   Orientation oriented to person, place, time/date and situation   Memory recent and remote memory grossly intact   Consciousness alert and awake   Attention/Concentration attention span and concentration are age appropriate   Insight intact   Judgement intact   Muscle Strength and Gait normal muscle strength and normal muscle tone, normal gait/station and normal balance   Motor Activity no abnormal movements   Language no difficulty naming common objects, no difficulty repeating a phrase, no difficulty writing a sentence   Fund of Knowledge adequate knowledge of current events  adequate fund of knowledge regarding past history  adequate fund of knowledge regarding vocabulary      Past Psychiatric History Update:     Inpatient Psychiatric Admission Since Last Encounter:   no  Changes to Outpatient Psychiatric Treatment Team:    no  Suicide Attempt Or Self Mutilation Since Last Encounter:   no  Incidence of Violent Behavior Since Last Encounter:   no    Traumatic History Update:     New Onset of Abuse Since Last Encounter:   no  Traumatic Events Since Last Encounter:   no    Past Medical History:    Past Medical History:   Diagnosis Date    Carpal tunnel syndrome     Fibromyalgia     Lupus ([address] )     Spinal stenosis      No past medical history pertinent negatives  Past Surgical History:   Procedure Laterality Date     SECTION      KNEE SURGERY      Knee Arthroscopy (Therapeutic)     Allergies   Allergen Reactions    Epinephrine     Lidocaine Hives    Moxifloxacin Hives     avelox     Substance Abuse History:    Social History     Substance and Sexual Activity   Alcohol Use No     Social History     Substance and Sexual Activity   Drug Use No     Social History:    Social History     Socioeconomic History    Marital status: /Civil Union     Spouse name: Not on file    Number of children: Not on file    Years of education: Not on file    Highest education level: Not on file   Occupational History    Not on file   Tobacco Use    Smoking status: Never Smoker    Smokeless tobacco: Never Used    Tobacco comment: Per [employer];  Former smoker   Substance and Sexual Activity    Alcohol use: No    Drug use: No    Sexual activity: Not on file   Other Topics Concern    Not on file   Social History Narrative    Not on file     Social Determinants of Health     Financial Resource Strain: Not on file   Food Insecurity: Not on file   Transportation Needs: Not on file   Physical Activity: Not on file   Stress: Not on file   Social Connections: Not on file   Intimate Partner Violence: Not on file   Housing Stability: Not on file     Family Psychiatric History:     Family History   Problem Relation Age of Onset    Ovarian cancer Sister      History Review: The following portions of the patient's history were reviewed and updated as appropriate: allergies, current medications, past family history, past medical history, past social history, past surgical history and problem list     OBJECTIVE:     Vital signs in last 24 hours: There were no vitals filed for this visit  Laboratory Results: I have personally reviewed all pertinent laboratory/tests results  Suicide/Homicide Risk Assessment:    Risk of Harm to Self:  Protective Factors: no current suicidal ideation, access to mental health treatment, compliant with medications, compliant with mental health treatment  Based on today's assessment, Mitzi Harper presents the following risk of harm to self: minimal    Risk of Harm to Others:  Based on today's assessment, Mitzi Harper presents the following risk of harm to others: none    The following interventions are recommended: no intervention changes needed    Medications Risks/Benefits:      Risks, Benefits And Possible Side Effects Of Medications:    Discussed risks and benefits of treatment with patient including risk of suicidality, serotonin syndrome, increased QTc interval and SIADH related to treatment with antidepressants; Risk of induction of manic symptoms in certain patient populations     Controlled Medication Discussion:     Not applicable    Treatment Plan:    Due for update/Updated:   no  Last treatment plan done 6/1/21 by bal rosen  Treatment Plan due on 12/2/22      BAKARI Akhtar 02/21/22 Attending

## 2022-04-01 DIAGNOSIS — F41.8 DEPRESSION WITH ANXIETY: ICD-10-CM

## 2022-05-17 DIAGNOSIS — F41.8 DEPRESSION WITH ANXIETY: ICD-10-CM

## 2022-05-23 ENCOUNTER — OFFICE VISIT (OUTPATIENT)
Dept: PSYCHIATRY | Facility: CLINIC | Age: 52
End: 2022-05-23
Payer: COMMERCIAL

## 2022-05-23 DIAGNOSIS — F41.1 GENERALIZED ANXIETY DISORDER: ICD-10-CM

## 2022-05-23 DIAGNOSIS — F41.8 DEPRESSION WITH ANXIETY: Primary | ICD-10-CM

## 2022-05-23 PROCEDURE — 1036F TOBACCO NON-USER: CPT

## 2022-05-23 PROCEDURE — 99213 OFFICE O/P EST LOW 20 MIN: CPT

## 2022-05-23 RX ORDER — SERTRALINE HYDROCHLORIDE 100 MG/1
100 TABLET, FILM COATED ORAL DAILY
Qty: 90 TABLET | Refills: 0 | Status: SHIPPED | OUTPATIENT
Start: 2022-05-23

## 2022-05-23 NOTE — PSYCH
Regular Visit    Problem List Items Addressed This Visit        Other    Depression with anxiety - Primary    Relevant Medications    sertraline (ZOLOFT) 100 mg tablet      Other Visit Diagnoses     Generalized anxiety disorder        Relevant Medications    sertraline (ZOLOFT) 100 mg tablet             Encounter provider BAKARI Rowley    Provider located at   [place] [address] [address] [phone]    Recent Visits  No visits were found meeting these conditions  Showing recent visits within past 7 days and meeting all other requirements  Today's Visits  Date Type Provider Dept   05/23/22 Office Visit BAKARI Rowley  [facility]   Showing today's visits and meeting all other requirements  Future Appointments  No visits were found meeting these conditions  Showing future appointments within next 150 days and meeting all other requirements       HPI     Current Outpatient Medications   Medication Sig Dispense Refill    sertraline (ZOLOFT) 100 mg tablet Take 1 tablet (100 mg total) by mouth in the morning  90 tablet 0    gabapentin (NEURONTIN) 300 mg capsule Take 300 mg by mouth 3 (three) times a day Only takes 200 mg once a day at night  1    hydrOXYzine HCL (ATARAX) 25 mg tablet Take 1 tablet (25 mg total) by mouth every 6 (six) hours as needed for anxiety 90 tablet 0    meloxicam (MOBIC) 15 mg tablet TAKE 1 TABLET BY MOUTH EVERY DAY AS NEEDED (NOT DAILY USE)  2    morphine (MS CONTIN) 15 mg 12 hr tablet 15 mg every 12 (twelve) hours  0    oxyCODONE (ROXICODONE) 5 mg immediate release tablet Take 5 mg by mouth 2 (two) times a day as needed  0     No current facility-administered medications for this visit  Review of Systems        MEDICATION MANAGEMENT NOTE        [facility]    Name and Date of Birth:  Gris Hackett 49 y o  1970 MRN: [id number]    Date of Visit: May 23, 2022    Allergies   Allergen Reactions    Epinephrine     Lidocaine Hives    Moxifloxacin Hives     avelox     SUBJECTIVE:    Stephane Salinas is seen today for a follow up for depression and anxiety  She continues to do relatively well since the last visit  Patient reports no major changes in her mental health and feels the Zoloft 100 mg daily is adequate to manage her depression and anxiety symptoms  Reports 5/10 anxiety depression with symptoms of fatigue, low energy, dysphoria  Main stressor includes her struggling relationship with her  who she states she might be getting a divorce from, this is not a new stressor for her  Feels her symptoms are managed appropriately with the Zoloft 100 mg daily and declines an increase or any additions in her psychiatric medications  If anything, she prefers to be on a lower dose medication but agreed to remain on current medications  She continues to attend biweekly therapy sessions to her private therapist which she reports is very helpful  She has tried to include her  in the therapy sessions but he became defensive so she states "I am not going to waste my time with that any longer"  Patient denies any further concerns during today's encounter  Denies thoughts of self-harm and suicidal ideation  She denies any side effects from medications  PLAN:    Continue Zoloft 100 mg daily  Utilize p r n  Atarax as needed for breakthrough anxiety      Aware of 24 hour and weekend coverage for urgent situations accessed by calling [facility] main practice number  Continue psychotherapy with therapist    Diagnoses and all orders for this visit:    Depression with anxiety  -     sertraline (ZOLOFT) 100 mg tablet; Take 1 tablet (100 mg total) by mouth in the morning      Generalized anxiety disorder        Current Outpatient Medications on File Prior to Visit   Medication Sig Dispense Refill    gabapentin (NEURONTIN) 300 mg capsule Take 300 mg by mouth 3 (three) times a day Only takes 200 mg once a day at night  1    hydrOXYzine HCL (ATARAX) 25 mg tablet Take 1 tablet (25 mg total) by mouth every 6 (six) hours as needed for anxiety 90 tablet 0    meloxicam (MOBIC) 15 mg tablet TAKE 1 TABLET BY MOUTH EVERY DAY AS NEEDED (NOT DAILY USE)  2    morphine (MS CONTIN) 15 mg 12 hr tablet 15 mg every 12 (twelve) hours  0    oxyCODONE (ROXICODONE) 5 mg immediate release tablet Take 5 mg by mouth 2 (two) times a day as needed  0    [DISCONTINUED] sertraline (ZOLOFT) 50 mg tablet Take 1 tablet (50 mg total) by mouth in the morning  90 tablet 0     No current facility-administered medications on file prior to visit  HPI ROS Appetite Changes and Sleep:     She reports frequent awakenings, adequate appetite, adequate energy level   Denies homicidal ideation, denies suicidal ideation    Review Of Systems:      HPI ROS:               Medication Side Effects:  denies    Depression (10 worst): 6/10    Anxiety (10 worst): 5/10    Safety concerns (SI, HI, etc): Denies si and hi    Sleep: Difficulty maintaining sleep, denies sleep medications    Energy: fair    Appetite: good    Weight Change: denies        Mental Status Evaluation:    Appearance Adequate hygiene and grooming   Behavior calm and cooperative   Mood anxious and depressed  Depression Scale - 6 of 10 (0 = No depression)  Anxiety Scale - 5 of 10 (0 = No anxiety)   Speech Normal rate and volume   Affect appropriate   Thought Processes Goal directed and coherent   Thought Content Does not verbalize delusional material   Associations Tightly connected   Perceptual Disturbances Denies hallucinations and does not appear to be responding to internal stimuli   Risk Potential Suicidal/Homicidal Ideation - No evidence of suicidal or homicidal ideation and patient does not verbalize suicidal or homicidal ideation  Risk of Violence - No evidence of risk for violence found on assessment  Risk of Self Mutilation - No evidence of risk for self mutilation found on assessment   Orientation oriented to person, place, time/date and situation   Memory recent and remote memory grossly intact   Consciousness alert and awake   Attention/Concentration attention span and concentration are age appropriate   Insight intact   Judgement intact   Muscle Strength and Gait normal muscle strength and normal muscle tone, normal gait/station and normal balance   Motor Activity no abnormal movements   Language no difficulty naming common objects, no difficulty repeating a phrase, no difficulty writing a sentence   Fund of Knowledge adequate knowledge of current events  adequate fund of knowledge regarding past history  adequate fund of knowledge regarding vocabulary      Past Psychiatric History Update:     Inpatient Psychiatric Admission Since Last Encounter:   no  Changes to Outpatient Psychiatric Treatment Team:    no  Suicide Attempt Or Self Mutilation Since Last Encounter:   no  Incidence of Violent Behavior Since Last Encounter:   no    Traumatic History Update:     New Onset of Abuse Since Last Encounter:   no  Traumatic Events Since Last Encounter:   no    Past Medical History:    Past Medical History:   Diagnosis Date    Carpal tunnel syndrome     Fibromyalgia     [address] [address]     Spinal stenosis      No past medical history pertinent negatives    Past Surgical History:   Procedure Laterality Date     SECTION      KNEE SURGERY      Knee Arthroscopy (Therapeutic)     Allergies   Allergen Reactions    Epinephrine     Lidocaine Hives    Moxifloxacin Hives     avelox     Substance Abuse History:    Social History     Substance and Sexual Activity   Alcohol Use No     Social History     Substance and Sexual Activity   Drug Use No     Social History:    Social History     Socioeconomic History    Marital status: /Civil Union     Spouse name: Not on file    Number of children: Not on file    Years of education: Not on file    Highest education level: Not on file   Occupational History    Not on file   Tobacco Use    Smoking status: Never Smoker    Smokeless tobacco: Never Used    Tobacco comment: Per [employer]; Former smoker   Substance and Sexual Activity    Alcohol use: No    Drug use: No    Sexual activity: Not on file   Other Topics Concern    Not on file   Social History Narrative    Not on file     Social Determinants of Health     Financial Resource Strain: Not on file   Food Insecurity: Not on file   Transportation Needs: Not on file   Physical Activity: Not on file   Stress: Not on file   Social Connections: Not on file   Intimate Partner Violence: Not on file   Housing Stability: Not on file     Family Psychiatric History:     Family History   Problem Relation Age of Onset    Ovarian cancer Sister      History Review: The following portions of the patient's history were reviewed and updated as appropriate: allergies, current medications, past family history, past medical history, past social history, past surgical history and problem list     OBJECTIVE:     Vital signs in last 24 hours: There were no vitals filed for this visit  Laboratory Results: I have personally reviewed all pertinent laboratory/tests results      Suicide/Homicide Risk Assessment:    Risk of Harm to Self:  Protective Factors: no current suicidal ideation, access to mental health treatment, being a parent, being , compliant with medications, compliant with mental health treatment  Based on today's assessment, Frances Fraser presents the following risk of harm to self: minimal    Risk of Harm to Others:  Based on today's assessment, Frances Fraser presents the following risk of harm to others: none    The following interventions are recommended: therapy appointment in 1 weeks    Medications Risks/Benefits:      Risks, Benefits And Possible Side Effects Of Medications:    Discussed risks and benefits of treatment with patient including risk of suicidality, serotonin syndrome, increased QTc interval and SIADH related to treatment with antidepressants; Risk of induction of manic symptoms in certain patient populations     Controlled Medication Discussion:     Not applicable    Treatment Plan:    Due for update/Updated:   no  Last treatment plan done 5/23/22 by wendi stark  Treatment Plan due on 11/23/22      BAKARI Gudino 05/23/22

## 2022-05-23 NOTE — BH TREATMENT PLAN
TREATMENT PLAN (Medication Management Only)        [facility]    Name and Date of Birth:  Brenda Romero 49 y o  1970  Date of Treatment Plan: May 23, 2022  Diagnosis/Diagnoses:    1  Depression with anxiety    2  Generalized anxiety disorder      Strengths/Personal Resources for Self-Care: supportive family, supportive friends  Area/Areas of need (in own words): anxiety, depression  1  Long Term Goal: continue improvement in acceptable anxiety level  Target Date:6 months - 11/23/2022  Person/Persons responsible for completion of goal: Devi Akins  2  Short Term Objective (s) - How will we reach this goal?:   A  Provider new recommended medication/dosage changes and/or continue medication(s): continue current medications as prescribed  B  Attend medication management appointments regularly  C  Attend psychotherapy regularly  Target Date:6 months - 11/23/2022  Person/Persons Responsible for Completion of Goal: Devi Akins and wendi stark  Progress Towards Goals: continuing treatment  Treatment Modality: medication management every 3 months  Review due 180 days from date of this plan: 6 months - 11/23/2022  Expected length of service: maintenance  My Physician/PA/NP and I have developed this plan together and I agree to work on the goals and objectives  I understand the treatment goals that were developed for my treatment

## 2022-09-30 DIAGNOSIS — F41.8 DEPRESSION WITH ANXIETY: ICD-10-CM

## 2022-09-30 RX ORDER — SERTRALINE HYDROCHLORIDE 100 MG/1
100 TABLET, FILM COATED ORAL DAILY
Qty: 90 TABLET | Refills: 0 | Status: SHIPPED | OUTPATIENT
Start: 2022-09-30

## 2022-09-30 NOTE — TELEPHONE ENCOUNTER
Patient has not been seen since 5/23/22    Scheduled an appt for her on 11/7/22, she needed a 5 of later

## 2022-11-07 ENCOUNTER — OFFICE VISIT (OUTPATIENT)
Dept: PSYCHIATRY | Facility: CLINIC | Age: 52
End: 2022-11-07

## 2022-11-07 DIAGNOSIS — F11.20 CONTINUOUS OPIOID DEPENDENCE (HCC): ICD-10-CM

## 2022-11-07 DIAGNOSIS — F41.1 GENERALIZED ANXIETY DISORDER: Primary | ICD-10-CM

## 2022-11-07 DIAGNOSIS — F41.8 DEPRESSION WITH ANXIETY: ICD-10-CM

## 2022-11-07 RX ORDER — SERTRALINE HYDROCHLORIDE 100 MG/1
100 TABLET, FILM COATED ORAL DAILY
Qty: 90 TABLET | Refills: 0 | Status: SHIPPED | OUTPATIENT
Start: 2022-11-07

## 2022-11-07 NOTE — PSYCH
Regular Visit    Problem List Items Addressed This Visit        Other    Depression with anxiety    Relevant Medications    sertraline (ZOLOFT) 100 mg tablet    Continuous opioid dependence ([address] )      Other Visit Diagnoses     Generalized anxiety disorder    -  Primary    Relevant Medications    sertraline (ZOLOFT) 100 mg tablet             Encounter provider BAKARI Escalante    Provider located at    [address] [place] [address] E  [address] [facility] [address] [phone]    Recent Visits  No visits were found meeting these conditions  Showing recent visits within past 7 days and meeting all other requirements  Today's Visits  Date Type Provider Dept   11/07/22 Office Visit BAKARI Escalante [facility]   Showing today's visits and meeting all other requirements  Future Appointments  No visits were found meeting these conditions  Showing future appointments within next 150 days and meeting all other requirements       HPI     Current Outpatient Medications   Medication Sig Dispense Refill   • gabapentin (NEURONTIN) 300 mg capsule Take 300 mg by mouth 3 (three) times a day Only takes 200 mg once a day at night  1   • hydrOXYzine HCL (ATARAX) 25 mg tablet Take 1 tablet (25 mg total) by mouth every 6 (six) hours as needed for anxiety 90 tablet 0   • meloxicam (MOBIC) 15 mg tablet TAKE 1 TABLET BY MOUTH EVERY DAY AS NEEDED (NOT DAILY USE)  2   • morphine (MS CONTIN) 15 mg 12 hr tablet 15 mg every 12 (twelve) hours  0   • oxyCODONE (ROXICODONE) 5 mg immediate release tablet Take 5 mg by mouth 2 (two) times a day as needed  0   • sertraline (ZOLOFT) 100 mg tablet Take 1 tablet (100 mg total) by mouth daily 90 tablet 0     No current facility-administered medications for this visit         Review of Systems        MEDICATION MANAGEMENT NOTE        [address] [place] [address]    Name and Date of Birth: Kelly Fabian 47 y o  1970 MRN: [id number]    Date of Visit: November 7, 2022    Allergies   Allergen Reactions   • Epinephrine    • Lidocaine Hives   • Moxifloxacin Hives     avelox     SUBJECTIVE:    [facility] is seen today for a follow up for depression and Generalized Anxiety Disorder  She continues to do relatively well since the last visit  Patient is speaking about her  at length during today's visit, he remains extremely negative, difficult to live with, needing mental health treatment but refuses to believe mental health  He continues to be the main stressor and she attends weekly psychotherapy sessions to help cope with her difficult   Previously considered divorce, has not taken action  Recently found out her daughter is pregnant and she is greatly looking forward to being a grandparent and focusing on positive aspects of her life  Denies any panic attacks, declines a need to increase the dosage of Zoloft at this time and will continue Zoloft 100 milligrams daily  Denies side effects  Sleep and eating adequately  Her main support system is her parents and her sister  Denies SI  She denies any side effects from medications  PLAN:    -Continue Zoloft 100 mg daily PARQ completed including serotonin syndrome, SIADH, worsening depression, suicidality, induction of yi, GI upset, headaches, activation, sexual side effects, sedation, potential drug interactions, and others     -Utilize p r n  Atarax as needed for breakthrough anxiety      Aware of 24 hour and weekend coverage for urgent situations accessed by calling [facility] main practice number  Continue psychotherapy with therapist    Diagnoses and all orders for this visit:    Generalized anxiety disorder    Depression with anxiety  -     sertraline (ZOLOFT) 100 mg tablet;  Take 1 tablet (100 mg total) by mouth daily    Continuous opioid dependence ([facility] [address] )        Current Outpatient Medications on File Prior to Visit   Medication Sig Dispense Refill   • gabapentin (NEURONTIN) 300 mg capsule Take 300 mg by mouth 3 (three) times a day Only takes 200 mg once a day at night  1   • hydrOXYzine HCL (ATARAX) 25 mg tablet Take 1 tablet (25 mg total) by mouth every 6 (six) hours as needed for anxiety 90 tablet 0   • meloxicam (MOBIC) 15 mg tablet TAKE 1 TABLET BY MOUTH EVERY DAY AS NEEDED (NOT DAILY USE)  2   • morphine (MS CONTIN) 15 mg 12 hr tablet 15 mg every 12 (twelve) hours  0   • oxyCODONE (ROXICODONE) 5 mg immediate release tablet Take 5 mg by mouth 2 (two) times a day as needed  0   • [DISCONTINUED] sertraline (ZOLOFT) 100 mg tablet Take 1 tablet (100 mg total) by mouth daily 90 tablet 0     No current facility-administered medications on file prior to visit  HPI ROS Appetite Changes and Sleep:     She reports normal sleep, adequate appetite, adequate energy level   Denies homicidal ideation, denies suicidal ideation    Review Of Systems:      HPI ROS:               Medication Side Effects:  denies    Depression (10 worst): 5/10    Anxiety (10 worst): 5/10    Safety concerns (SI, HI, etc): Denies si and hi    Sleep: 6-8 hrs    Energy: fair    Appetite: 3 meals daily    Weight Change: denies        Mental Status Evaluation:    Appearance Adequate hygiene and grooming   Behavior calm and cooperative   Mood anxious and depressed  Depression Scale - 5 of 10 (0 = No depression)  Anxiety Scale - 5 of 10 (0 = No anxiety)   Speech Normal rate and volume   Affect mood-congruent   Thought Processes Goal directed and coherent   Thought Content Does not verbalize delusional material   Associations Tightly connected   Perceptual Disturbances Denies hallucinations and does not appear to be responding to internal stimuli   Risk Potential Suicidal/Homicidal Ideation - No evidence of suicidal or homicidal ideation and patient does not verbalize suicidal or homicidal ideation  Risk of Violence - No evidence of risk for violence found on assessment  Risk of Self Mutilation - No evidence of risk for self mutilation found on assessment   Orientation oriented to person, place, time/date and situation   Memory recent and remote memory grossly intact   Consciousness alert and awake   Attention/Concentration attention span and concentration are age appropriate   Insight intact   Judgement intact   Muscle Strength and Gait normal muscle strength and normal muscle tone, normal gait/station and normal balance   Motor Activity no abnormal movements   Language no difficulty naming common objects, no difficulty repeating a phrase, no difficulty writing a sentence   Fund of Knowledge adequate knowledge of current events  adequate fund of knowledge regarding past history  adequate fund of knowledge regarding vocabulary      Past Psychiatric History Update:     Inpatient Psychiatric Admission Since Last Encounter:   no  Changes to Outpatient Psychiatric Treatment Team:    no  Suicide Attempt Or Self Mutilation Since Last Encounter:   no  Incidence of Violent Behavior Since Last Encounter:   no    Traumatic History Update:     New Onset of Abuse Since Last Encounter:   no  Traumatic Events Since Last Encounter:   no    Past Medical History:    Past Medical History:   Diagnosis Date   • Carpal tunnel syndrome    • Fibromyalgia    • Lupus ([address] )    • Spinal stenosis         Past Surgical History:   Procedure Laterality Date   •  SECTION     • KNEE SURGERY      Knee Arthroscopy (Therapeutic)     Allergies   Allergen Reactions   • Epinephrine    • Lidocaine Hives   • Moxifloxacin Hives     avelox     Substance Abuse History:    Social History     Substance and Sexual Activity   Alcohol Use No     Social History     Substance and Sexual Activity   Drug Use No     Social History:    Social History     Socioeconomic History   • Marital status: /Civil Union     Spouse name: Not on file   • Number of children: Not on file   • Years of education: Not on file   • Highest education level: Not on file   Occupational History   • Not on file   Tobacco Use   • Smoking status: Never Smoker   • Smokeless tobacco: Never Used   • Tobacco comment: Per [employer]; Former smoker   Substance and Sexual Activity   • Alcohol use: No   • Drug use: No   • Sexual activity: Not on file   Other Topics Concern   • Not on file   Social History Narrative   • Not on file     Social Determinants of Health     Financial Resource Strain: Not on file   Food Insecurity: Not on file   Transportation Needs: Not on file   Physical Activity: Not on file   Stress: Not on file   Social Connections: Not on file   Intimate Partner Violence: Not on file   Housing Stability: Not on file     Family Psychiatric History:     Family History   Problem Relation Age of Onset   • Ovarian cancer Sister      History Review: The following portions of the patient's history were reviewed and updated as appropriate: allergies, current medications, past family history, past medical history, past social history, past surgical history and problem list     OBJECTIVE:     Vital signs in last 24 hours: There were no vitals filed for this visit  Laboratory Results:   Recent Labs (last 2 months):   No visits with results within 2 Month(s) from this visit     Latest known visit with results is:   Appointment on 10/20/2018   Component Date Value   • Sodium 10/20/2018 138    • Potassium 10/20/2018 4 2    • Chloride 10/20/2018 106    • CO2 10/20/2018 27    • ANION GAP 10/20/2018 5    • BUN 10/20/2018 11    • Creatinine 10/20/2018 0 57 (A)   • Glucose, Fasting 10/20/2018 79    • Calcium 10/20/2018 10 2 (A)   • Corrected Calcium 10/20/2018 10 4 (A)   • AST 10/20/2018 18    • ALT 10/20/2018 37    • Alkaline Phosphatase 10/20/2018 56    • Total Protein 10/20/2018 6 9    • Albumin 10/20/2018 3 8    • Total Bilirubin 10/20/2018 0 59    • eGFR 10/20/2018 111    • Cholesterol 10/20/2018 178    • Triglycerides 10/20/2018 114    • HDL, Direct 10/20/2018 50    • LDL Calculated 10/20/2018 105 (A)   • Non-HDL-Chol (CHOL-HDL) 10/20/2018 128    • WBC 10/20/2018 5 83    • RBC 10/20/2018 4 26    • Hemoglobin 10/20/2018 12 2    • Hematocrit 10/20/2018 39 5    • MCV 10/20/2018 93    • MCH 10/20/2018 28 6    • MCHC 10/20/2018 30 9 (A)   • RDW 10/20/2018 12 9    • MPV 10/20/2018 10 8    • Platelets 34/24/0934 341    • nRBC 10/20/2018 0    • Neutrophils Relative 10/20/2018 46    • Immat GRANS % 10/20/2018 0    • Lymphocytes Relative 10/20/2018 41    • Monocytes Relative 10/20/2018 10    • Eosinophils Relative 10/20/2018 2    • Basophils Relative 10/20/2018 1    • Neutrophils Absolute 10/20/2018 2 67    • Immature Grans Absolute 10/20/2018 0 02    • Lymphocytes Absolute 10/20/2018 2 39    • Monocytes Absolute 10/20/2018 0 58    • Eosinophils Absolute 10/20/2018 0 10    • Basophils Absolute 10/20/2018 0 07      I have personally reviewed all pertinent laboratory/tests results  Suicide/Homicide Risk Assessment:    Risk of Harm to Self:  Protective Factors: no current suicidal ideation, access to mental health treatment, being a parent, being , compliant with medications, compliant with mental health treatment, connection to community, connection to own children, contact with caregivers, good self-esteem, having a desire to be alive, having a sense of purpose or meaning in life  Based on today's assessment, Carlos Marking presents the following risk of harm to self: minimal    Risk of Harm to Others:  Based on today's assessment, Carlos Marking presents the following risk of harm to others: none    The following interventions are recommended: therapy appointment in 1 weeks    Medications Risks/Benefits:      Risks, Benefits And Possible Side Effects Of Medications:    Discussed risks and benefits of treatment with patient including risk of suicidality, serotonin syndrome, increased QTc interval and SIADH related to treatment with antidepressants;  Risk of induction of manic symptoms in certain patient populations     Controlled Medication Discussion:     Not applicable    Treatment Plan:    Due for update/Updated:   no  Last treatment plan done 5/23/22 by wendi stark  Treatment Plan due on 11/23/22      BAKARI Phillips 11/07/22  Visit Time    Visit Start Time: 471  Visit Stop Time: 998  Total Visit Duration: 30 minutes

## 2022-11-16 ENCOUNTER — APPOINTMENT (OUTPATIENT)
Dept: RADIOLOGY | Facility: CLINIC | Age: 52
End: 2022-11-16

## 2022-11-16 ENCOUNTER — OFFICE VISIT (OUTPATIENT)
Dept: URGENT CARE | Facility: CLINIC | Age: 52
End: 2022-11-16

## 2022-11-16 VITALS
OXYGEN SATURATION: 98 % | RESPIRATION RATE: 18 BRPM | TEMPERATURE: 98.1 F | SYSTOLIC BLOOD PRESSURE: 153 MMHG | HEART RATE: 70 BPM | DIASTOLIC BLOOD PRESSURE: 77 MMHG
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
## 2022-11-16 NOTE — PROGRESS NOTES
[address]        NAME: Fátima Figueroa is a 47 y o  female  : 1970    MRN: [id number]  DATE: 2022  TIME: 3:28 PM    Assessment and Orders   Fall, initial encounter [W19  XXXA]  1  Fall, initial encounter  XR hand 3+ vw left    XR hip/pelv 2-3 vws left if performed            Plan and Discussion      Patient complaining of left hip and lower back pain in addition to her left hand pain  X-ray of left hip and pelvis as well as x-ray of the left hand did not reveal any osseous abnormalities  Patient with chronic lower back pain and stenosis  I suspect that patient's pain is secondary to hyper analgesia as well as muscle spasms in the left lower lumbar spine  Patient understands that I an unable to give her more pain medication as she is being managed by pain management  Encouraged frequent stretching and movement to avoid stiffness and for the spasming  Encouraged applying heat to the area and this will help loosen muscles  Risks and benefits discussed  Patient understands and agrees with the plan  Follow up with PCP  Chief Complaint     Chief Complaint   Patient presents with   • Fall     Pt fell at home this am landing on side c/o lower back pain that radiates down both thighs and left hand pain  History of Present Illness       HPI    Patient is a 73-year-old female who presents with lower back, left hip and left hand pain after fall today  She states she tripped over her cat and initially fell forward but somehow immediately fell backwards, bending at her lower spine and falling on her left hip  Patient does not exactly know how she fell but notes that immediately afterwards she had pain in her left hand and left hip  Patient has long history of spinal stenosis and is being managed by pain management with morphine and oxycodone  Patient states that now her pain radiates into her left thigh  She does not have any numbness or tingling down the leg    She has no bowel or bladder dysfunctions  She describes the pain as sharp and describes the pain in her left hand as feeling like there are shards of glass in it  Review of Systems   Review of Systems  As stated in HPI     Current Medications       Current Outpatient Medications:   •  gabapentin (NEURONTIN) 300 mg capsule, Take 300 mg by mouth 3 (three) times a day Only takes 200 mg once a day at night, Disp: , Rfl: 1  •  hydrOXYzine HCL (ATARAX) 25 mg tablet, Take 1 tablet (25 mg total) by mouth every 6 (six) hours as needed for anxiety, Disp: 90 tablet, Rfl: 0  •  meloxicam (MOBIC) 15 mg tablet, TAKE 1 TABLET BY MOUTH EVERY DAY AS NEEDED (NOT DAILY USE), Disp: , Rfl: 2  •  morphine (MS CONTIN) 15 mg 12 hr tablet, 15 mg every 12 (twelve) hours, Disp: , Rfl: 0  •  oxyCODONE (ROXICODONE) 5 mg immediate release tablet, Take 5 mg by mouth 2 (two) times a day as needed, Disp: , Rfl: 0  •  sertraline (ZOLOFT) 100 mg tablet, Take 1 tablet (100 mg total) by mouth daily, Disp: 90 tablet, Rfl: 0    Current Allergies     Allergies as of 2022 - Reviewed 2022   Allergen Reaction Noted   • Epinephrine  2016   • Lidocaine Hives 2018   • Moxifloxacin Hives             The following portions of the patient's history were reviewed and updated as appropriate: allergies, current medications, past family history, past medical history, past social history, past surgical history and problem list      Past Medical History:   Diagnosis Date   • Carpal tunnel syndrome    • Fibromyalgia    • Lupus ([facility] [address] )    • Spinal stenosis        Past Surgical History:   Procedure Laterality Date   •  SECTION     • KNEE SURGERY      Knee Arthroscopy (Therapeutic)       Family History   Problem Relation Age of Onset   • Ovarian cancer Sister          Medications have been verified  Objective   /77   Pulse 70   Temp 98 1 °F (36 7 °C) (Temporal)   Resp 18   SpO2 98%   No LMP recorded         Physical Exam Physical Exam  Constitutional:       General: She is in acute distress  HENT:      Head: Normocephalic and atraumatic  Musculoskeletal:      Left hand: Tenderness (Thenar eminence) present  No swelling, deformity, lacerations or bony tenderness  Normal range of motion  Normal strength  Normal sensation  Normal pulse  Back:       Left hip: Bony tenderness (Femoral head and acetabulum) present  No crepitus  Decreased range of motion (Limited hip flexion)  Normal strength  Comments: Area of tenderness and palpable muscle spasm  No step-offs noted  There is general spinal process pain in both lumbar and thoracic spine  Neurological:      Mental Status: She is alert                 Jessica Bartholomew DO

## 2023-01-05 DIAGNOSIS — F41.8 DEPRESSION WITH ANXIETY: ICD-10-CM

## 2023-01-05 RX ORDER — SERTRALINE HYDROCHLORIDE 100 MG/1
100 TABLET, FILM COATED ORAL DAILY
Qty: 90 TABLET | Refills: 0 | Status: SHIPPED | OUTPATIENT
Start: 2023-01-05

## 2023-01-05 RX ORDER — SERTRALINE HYDROCHLORIDE 100 MG/1
100 TABLET, FILM COATED ORAL DAILY
Qty: 10 TABLET | Refills: 0 | Status: SHIPPED | OUTPATIENT
Start: 2023-01-05

## 2023-01-05 RX ORDER — SERTRALINE HYDROCHLORIDE 100 MG/1
100 TABLET, FILM COATED ORAL DAILY
Qty: 90 TABLET | Refills: 0 | Status: SHIPPED | OUTPATIENT
Start: 2023-01-05 | End: 2023-01-05

## 2023-01-10 ENCOUNTER — APPOINTMENT (OUTPATIENT)
Dept: RADIOLOGY | Facility: CLINIC | Age: 53
End: 2023-01-10

## 2023-01-16 DIAGNOSIS — F41.8 DEPRESSION WITH ANXIETY: ICD-10-CM

## 2023-01-17 RX ORDER — SERTRALINE HYDROCHLORIDE 100 MG/1
TABLET, FILM COATED ORAL
Qty: 90 TABLET | Refills: 0 | Status: SHIPPED | OUTPATIENT
Start: 2023-01-17

## 2023-02-13 ENCOUNTER — OFFICE VISIT (OUTPATIENT)
Dept: PSYCHIATRY | Facility: CLINIC | Age: 53
End: 2023-02-13

## 2023-02-13 DIAGNOSIS — F41.1 GENERALIZED ANXIETY DISORDER: Primary | ICD-10-CM

## 2023-02-13 DIAGNOSIS — F41.8 DEPRESSION WITH ANXIETY: ICD-10-CM

## 2023-02-13 RX ORDER — HYDROXYZINE HYDROCHLORIDE 25 MG/1
25 TABLET, FILM COATED ORAL EVERY 6 HOURS PRN
Qty: 90 TABLET | Refills: 1 | Status: SHIPPED | OUTPATIENT
Start: 2023-02-13

## 2023-02-13 RX ORDER — SERTRALINE HYDROCHLORIDE 100 MG/1
100 TABLET, FILM COATED ORAL DAILY
Qty: 90 TABLET | Refills: 3
Start: 2023-02-13

## 2023-02-13 RX ORDER — METHYLPREDNISOLONE 4 MG/1
TABLET ORAL
COMMUNITY
Start: 2022-12-27

## 2023-02-14 NOTE — PSYCH
Regular Visit    Problem List Items Addressed This Visit        Other    Depression with anxiety    Relevant Medications    sertraline (ZOLOFT) 100 mg tablet    hydrOXYzine HCL (ATARAX) 25 mg tablet   Other Visit Diagnoses     Generalized anxiety disorder    -  Primary    Relevant Medications    sertraline (ZOLOFT) 100 mg tablet    hydrOXYzine HCL (ATARAX) 25 mg tablet             Encounter provider BAKARI Aguirre    Provider located at    [address] [place] [id number] [phone]    Recent Visits  Date Type Provider Dept   02/13/23 Office Visit BAKARI Aguirre [facility]   Showing recent visits within past 7 days and meeting all other requirements  Future Appointments  No visits were found meeting these conditions  Showing future appointments within next 150 days and meeting all other requirements       HPI     Current Outpatient Medications   Medication Sig Dispense Refill   • gabapentin (NEURONTIN) 300 mg capsule Take 300 mg by mouth 3 (three) times a day Only takes 200 mg once a day at night  1   • hydrOXYzine HCL (ATARAX) 25 mg tablet Take 1 tablet (25 mg total) by mouth every 6 (six) hours as needed for anxiety 90 tablet 1   • meloxicam (MOBIC) 15 mg tablet TAKE 1 TABLET BY MOUTH EVERY DAY AS NEEDED (NOT DAILY USE)  2   • methylPREDNISolone 4 MG tablet therapy pack use as directed for 6 days     • morphine (MS CONTIN) 15 mg 12 hr tablet 15 mg every 12 (twelve) hours  0   • oxyCODONE (ROXICODONE) 5 mg immediate release tablet Take 5 mg by mouth 2 (two) times a day as needed  0   • sertraline (ZOLOFT) 100 mg tablet Take 1 tablet (100 mg total) by mouth daily 90 tablet 3     No current facility-administered medications for this visit         Review of Systems        MEDICATION MANAGEMENT NOTE        [facility]    Name and Date of Birth: Romeo [place] 47 y o  1970 MRN: [id number]    Date of Visit: February 14, 2023    Allergies   Allergen Reactions   • Epinephrine    • Lidocaine Hives   • Moxifloxacin Hives     avelox     SUBJECTIVE:    Lidya Zayas is seen today for a follow up for depression and Generalized Anxiety Disorder  She continues to do fairly well since the last visit  Reports no major changes since last visit, remained stable on Zoloft 100 mg daily and as needed Atarax for breakthrough anxiety   remains her biggest stressor, describes that he is a narcissist and has yet to take action in terms of any potential separation from him  States her  lives in the basement, anytime her and her son hears him walking up steps they scatter  Encouraged patient to  set a goal and work towards it in terms of making decision regarding relationship with -has considered getting a  in the past but has yet to take action  Feels her mood shift negatively anytime she is near him, mood much improved when she is away from them  Moderate depression persist related to the relationship issues, denies any need to titrate Zoloft and believes it is helpful, denies side effects  Continues to attend regular psychotherapy on a biweekly approach  She is forward thinking and looking forward to her grandson being born  Denies SI, continue psychotherapy  She denies any side effects from medications  PLAN:    -Continue Zoloft 100 mg daily PARQ completed including serotonin syndrome, SIADH, worsening depression, suicidality, induction of yi, GI upset, headaches, activation, sexual side effects, sedation, potential drug interactions, and others      -Utilize p r n   Atarax as needed for breakthrough anxiety       Aware of 24 hour and weekend coverage for urgent situations accessed by calling [facility] main practice number  Continue psychotherapy with therapist    Diagnoses and all orders for this visit:    Generalized anxiety disorder    Depression with anxiety  -     sertraline (ZOLOFT) 100 mg tablet; Take 1 tablet (100 mg total) by mouth daily  -     hydrOXYzine HCL (ATARAX) 25 mg tablet; Take 1 tablet (25 mg total) by mouth every 6 (six) hours as needed for anxiety    Other orders  -     methylPREDNISolone 4 MG tablet therapy pack; use as directed for 6 days        Current Outpatient Medications on File Prior to Visit   Medication Sig Dispense Refill   • gabapentin (NEURONTIN) 300 mg capsule Take 300 mg by mouth 3 (three) times a day Only takes 200 mg once a day at night  1   • meloxicam (MOBIC) 15 mg tablet TAKE 1 TABLET BY MOUTH EVERY DAY AS NEEDED (NOT DAILY USE)  2   • methylPREDNISolone 4 MG tablet therapy pack use as directed for 6 days     • morphine (MS CONTIN) 15 mg 12 hr tablet 15 mg every 12 (twelve) hours  0   • oxyCODONE (ROXICODONE) 5 mg immediate release tablet Take 5 mg by mouth 2 (two) times a day as needed  0     No current facility-administered medications on file prior to visit  HPI ROS Appetite Changes and Sleep:     She reports normal sleep, adequate appetite, adequate energy level   Denies homicidal ideation, denies suicidal ideation    Review Of Systems:      HPI ROS:               Medication Side Effects:  denies    Depression (10 worst): 4/10    Anxiety (10 worst): 4/10    Safety concerns (SI, HI, etc): Denies si and hi    Sleep: 7 hrs    Energy: fair    Appetite: 2-3 meals    Weight Change: denies        Mental Status Evaluation:    Appearance Adequate hygiene and grooming   Behavior calm and cooperative   Mood anxious and depressed  Depression Scale - 4 of 10 (0 = No depression)  Anxiety Scale - 4 of 10 (0 = No anxiety)   Speech Normal rate and volume   Affect mood-congruent   Thought Processes Goal directed and coherent   Thought Content Does not verbalize delusional material   Associations Tightly connected   Perceptual Disturbances Denies hallucinations and does not appear to be responding to internal stimuli   Risk Potential Suicidal/Homicidal Ideation - No evidence of suicidal or homicidal ideation and patient does not verbalize suicidal or homicidal ideation  Risk of Violence - No evidence of risk for violence found on assessment  Risk of Self Mutilation - No evidence of risk for self mutilation found on assessment   Orientation oriented to person, place, time/date and situation   Memory recent and remote memory grossly intact   Consciousness alert and awake   Attention/Concentration attention span and concentration are age appropriate   Insight intact   Judgement intact   Muscle Strength and Gait normal muscle strength and normal muscle tone, normal gait/station and normal balance   Motor Activity no abnormal movements   Language no difficulty naming common objects, no difficulty repeating a phrase, no difficulty writing a sentence   Fund of Knowledge adequate knowledge of current events  adequate fund of knowledge regarding past history  adequate fund of knowledge regarding vocabulary      Past Psychiatric History Update:     Inpatient Psychiatric Admission Since Last Encounter:   no  Changes to Outpatient Psychiatric Treatment Team:    no  Suicide Attempt Or Self Mutilation Since Last Encounter:   no  Incidence of Violent Behavior Since Last Encounter:   no    Traumatic History Update:     New Onset of Abuse Since Last Encounter:   no  Traumatic Events Since Last Encounter:   no    Past Medical History:    Past Medical History:   Diagnosis Date   • Carpal tunnel syndrome    • Fibromyalgia    • Lupus ([facility] [address] )    • Spinal stenosis         Past Surgical History:   Procedure Laterality Date   •  SECTION     • KNEE SURGERY      Knee Arthroscopy (Therapeutic)     Allergies   Allergen Reactions   • Epinephrine    • Lidocaine Hives   • Moxifloxacin Hives     avelox     Substance Abuse History:    Social History     Substance and Sexual Activity   Alcohol Use No     Social History     Substance and Sexual Activity   Drug Use No     Social History:    Social History     Socioeconomic History   • Marital status: /Civil Union     Spouse name: Not on file   • Number of children: Not on file   • Years of education: Not on file   • Highest education level: Not on file   Occupational History   • Not on file   Tobacco Use   • Smoking status: Never   • Smokeless tobacco: Never   • Tobacco comments:     Per [employer]; Former smoker   Substance and Sexual Activity   • Alcohol use: No   • Drug use: No   • Sexual activity: Not on file   Other Topics Concern   • Not on file   Social History Narrative   • Not on file     Social Determinants of Health     Financial Resource Strain: Not on file   Food Insecurity: Not on file   Transportation Needs: Not on file   Physical Activity: Not on file   Stress: Not on file   Social Connections: Not on file   Intimate Partner Violence: Not on file   Housing Stability: Not on file     Family Psychiatric History:     Family History   Problem Relation Age of Onset   • Ovarian cancer Sister      History Review: The following portions of the patient's history were reviewed and updated as appropriate: allergies, current medications, past family history, past medical history, past social history, past surgical history and problem list     OBJECTIVE:     Vital signs in last 24 hours: There were no vitals filed for this visit  Laboratory Results:   Recent Labs (last 2 months):   No visits with results within 2 Month(s) from this visit     Latest known visit with results is:   Appointment on 10/20/2018   Component Date Value   • Sodium 10/20/2018 138    • Potassium 10/20/2018 4 2    • Chloride 10/20/2018 106    • CO2 10/20/2018 27    • ANION GAP 10/20/2018 5    • BUN 10/20/2018 11    • Creatinine 10/20/2018 0 57 (L)    • Glucose, Fasting 10/20/2018 79    • Calcium 10/20/2018 10 2 (H)    • Corrected Calcium 10/20/2018 10 4 (H)    • AST 10/20/2018 18    • ALT 10/20/2018 37    • Alkaline Phosphatase 10/20/2018 56    • Total Protein 10/20/2018 6 9    • Albumin 10/20/2018 3 8    • Total Bilirubin 10/20/2018 0 59    • eGFR 10/20/2018 111    • Cholesterol 10/20/2018 178    • Triglycerides 10/20/2018 114    • HDL, Direct 10/20/2018 50    • LDL Calculated 10/20/2018 105 (H)    • Non-HDL-Chol (CHOL-HDL) 10/20/2018 128    • WBC 10/20/2018 5 83    • RBC 10/20/2018 4 26    • Hemoglobin 10/20/2018 12 2    • Hematocrit 10/20/2018 39 5    • MCV 10/20/2018 93    • MCH 10/20/2018 28 6    • MCHC 10/20/2018 30 9 (L)    • RDW 10/20/2018 12 9    • MPV 10/20/2018 10 8    • Platelets 28/75/1630 341    • nRBC 10/20/2018 0    • Neutrophils Relative 10/20/2018 46    • Immat GRANS % 10/20/2018 0    • Lymphocytes Relative 10/20/2018 41    • Monocytes Relative 10/20/2018 10    • Eosinophils Relative 10/20/2018 2    • Basophils Relative 10/20/2018 1    • Neutrophils Absolute 10/20/2018 2 67    • Immature Grans Absolute 10/20/2018 0 02    • Lymphocytes Absolute 10/20/2018 2 39    • Monocytes Absolute 10/20/2018 0 58    • Eosinophils Absolute 10/20/2018 0 10    • Basophils Absolute 10/20/2018 0 07      I have personally reviewed all pertinent laboratory/tests results      Suicide/Homicide Risk Assessment:    Risk of Harm to Self:  Protective Factors: no current suicidal ideation, access to mental health treatment, being a parent, being , compliant with medications, compliant with mental health treatment, connection to community, having a sense of purpose or meaning in life, medical compliance, responsibilities and duties to others, stable living environment  Based on today's assessment, BODØ presents the following risk of harm to self: minimal    Risk of Harm to Others:  Based on today's assessment, BODØ presents the following risk of harm to others: none    The following interventions are recommended: therapy appointment in 1 weeks    Medications Risks/Benefits:      Risks, Benefits And Possible Side Effects Of Medications:    Discussed risks and benefits of treatment with patient including risk of suicidality, serotonin syndrome, increased QTc interval and SIADH related to treatment with antidepressants; Risk of induction of manic symptoms in certain patient populations     Controlled Medication Discussion:     Not applicable    Treatment Plan:    Due for update/Updated:   yes  Last treatment plan done 5/23/22 by wendi stark  Treatment Plan due on 11/23/22      BAKARI Fernandez 02/14/23  Visit Time    Visit Start Time: 5  Visit Stop Time: 530  Total Visit Duration: 30 minutes

## 2023-05-15 ENCOUNTER — TELEMEDICINE (OUTPATIENT)
Dept: PSYCHIATRY | Facility: CLINIC | Age: 53
End: 2023-05-15

## 2023-05-15 DIAGNOSIS — F41.8 DEPRESSION WITH ANXIETY: Primary | ICD-10-CM

## 2023-05-15 RX ORDER — SERTRALINE HYDROCHLORIDE 100 MG/1
100 TABLET, FILM COATED ORAL DAILY
Qty: 90 TABLET | Refills: 0 | Status: SHIPPED | OUTPATIENT
Start: 2023-05-15
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
## 2023-08-21 ENCOUNTER — TELEMEDICINE (OUTPATIENT)
Dept: PSYCHIATRY | Facility: CLINIC | Age: 53
End: 2023-08-21
Payer: COMMERCIAL

## 2023-08-21 DIAGNOSIS — F41.8 DEPRESSION WITH ANXIETY: Primary | ICD-10-CM

## 2023-08-21 PROCEDURE — 99213 OFFICE O/P EST LOW 20 MIN: CPT

## 2023-08-21 RX ORDER — SERTRALINE HYDROCHLORIDE 100 MG/1
100 TABLET, FILM COATED ORAL DAILY
Qty: 90 TABLET | Refills: 0 | Status: SHIPPED | OUTPATIENT
Start: 2023-08-21

## 2023-08-21 NOTE — BH TREATMENT PLAN
TREATMENT PLAN (Medication Management Only)        [facility]    Name and Date of Birth:  Ana Munson 47 y.o. 1970  Date of Treatment Plan: August 21, 2023  Diagnosis/Diagnoses:    1. Generalized anxiety disorder      Strengths/Personal Resources for Self-Care: supportive friends, taking medications as prescribed, ability to adapt to life changes, ability to communicate needs. Area/Areas of need (in own words): anxiety symptoms, depressive symptoms  1. Long Term Goal: continue improvement in acceptable anxiety level. Target Date:6 months - 2/21/2024  Person/Persons responsible for completion of goal: Marino Valdovinos  2. Short Term Objective (s) - How will we reach this goal?:   A. Provider new recommended medication/dosage changes and/or continue medication(s): continue current medications as prescribed. B. Attend medication management appointments regularly. C. Take psychiatric medications responsibly. Target Date:6 months - 2/21/2024  Person/Persons Responsible for Completion of Goal: Marino Valdovinos  Progress Towards Goals: continuing treatment  Treatment Modality: medication management every 3 months  Review due 180 days from date of this plan: 6 months - 2/21/2024  Expected length of service: ongoing treatment  My Physician/PA/NP and I have developed this plan together and I agree to work on the goals and objectives. I understand the treatment goals that were developed for my treatment.

## 2023-11-13 ENCOUNTER — TELEMEDICINE (OUTPATIENT)
Dept: PSYCHIATRY | Facility: CLINIC | Age: 53
End: 2023-11-13
Payer: COMMERCIAL

## 2023-11-13 DIAGNOSIS — F41.8 DEPRESSION WITH ANXIETY: Primary | ICD-10-CM

## 2023-11-13 PROCEDURE — 99213 OFFICE O/P EST LOW 20 MIN: CPT

## 2023-11-13 RX ORDER — SERTRALINE HYDROCHLORIDE 100 MG/1
100 TABLET, FILM COATED ORAL DAILY
Qty: 90 TABLET | Refills: 1 | Status: SHIPPED | OUTPATIENT
Start: 2023-11-13

## 2023-11-13 RX ORDER — HYDROXYZINE HYDROCHLORIDE 25 MG/1
25 TABLET, FILM COATED ORAL EVERY 6 HOURS PRN
Qty: 90 TABLET | Refills: 1 | Status: SHIPPED | OUTPATIENT
Start: 2023-11-13

## 2023-11-13 NOTE — PSYCH
Virtual Regular Visit    Verification of patient location:    Patient is located in the following state in which I hold an active license [place]    Problem List Items Addressed This Visit          Other    Depression with anxiety - Primary          Encounter provider BAKARI Rivera    Provider located at    [id number] [id number] [facility] [id number] [phone]    Recent Visits  No visits were found meeting these conditions. Showing recent visits within past 7 days and meeting all other requirements  Today's Visits  Date Type Provider Dept   11/13/23 Telemedicine BAKARI Rivera  [facility]   Showing today's visits and meeting all other requirements  Future Appointments  No visits were found meeting these conditions. Showing future appointments within next 150 days and meeting all other requirements         The patient was identified by name and date of birth. Srinivas Govea was informed that this is a telemedicine visit and [facility] She agrees to proceed. .  My office door was closed. No one else was in the room. She acknowledged consent and understanding of privacy and security of the video platform. The patient has agreed to participate and understands they can discontinue the visit at any time. Patient is aware this is a billable service.      HPI     Current Outpatient Medications   Medication Sig Dispense Refill    gabapentin (NEURONTIN) 300 mg capsule Take 300 mg by mouth 3 (three) times a day Only takes 200 mg once a day at night  1    hydrOXYzine HCL (ATARAX) 25 mg tablet Take 1 tablet (25 mg total) by mouth every 6 (six) hours as needed for anxiety 90 tablet 1    meloxicam (MOBIC) 15 mg tablet TAKE 1 TABLET BY MOUTH EVERY DAY AS NEEDED (NOT DAILY USE)  2    methylPREDNISolone 4 MG tablet therapy pack use as directed for 6 days      morphine (MS CONTIN) 15 mg 12 hr tablet 15 mg every 12 (twelve) hours  0    oxyCODONE (ROXICODONE) 5 mg immediate release tablet Take 5 mg by mouth 2 (two) times a day as needed  0    sertraline (ZOLOFT) 100 mg tablet Take 1 tablet (100 mg total) by mouth daily 90 tablet 0     No current facility-administered medications for this visit. Review of Systems  Video Exam    There were no vitals filed for this visit. Physical Exam   As a result of this visit, I have referred the patient for further respiratory evaluation. No      VIRTUAL VISIT DISCLAIMER    Michela Chaney acknowledges that she has consented to an online visit or consultation. She understands that the online visit is based solely on information provided by her, and that, in the absence of a face-to-face physical evaluation by the physician, the diagnosis she receives is both limited and provisional in terms of accuracy and completeness. This is not intended to replace a full medical face-to-face evaluation by the physician. Michela Chaney understands and accepts these terms. MEDICATION MANAGEMENT NOTE        [address] [facility]    Name and Date of Birth:  Michela Chaney 47 y.o. 1970 MRN: [id number]    Date of Visit: November 13, 2023    Allergies   Allergen Reactions    Epinephrine     Lidocaine Hives    Moxifloxacin Hives     avelox     SUBJECTIVE:    Juli Mathias is seen today for a follow up for Generalized Anxiety Disorder. She reports that she continues to do fairly well since the last visit. Juli Mathias reports her  lost his job, they currently have no insurance causing flareup in her anxiety symptoms. Unsure if she can still see this provider, reassured patient and we will make another appointment in 3 months for now. Reports moderate anxiety symptoms of restlessness, on edge, and racing thoughts-denies panic symptoms.   Reports no further changes over the past 3 months, relationship with  remains main stressor and there is no change to report today. Has considered divorce in the past but currently taking no actions. States her current Zoloft 100 mg daily is effective for symptoms and declines and needs for adjustments at this time. Continue to work on relationship with . Denies SI. She denies any side effects from medications. PLAN:    -Continue Zoloft 100 mg daily   PARQ completed including serotonin syndrome, SIADH, worsening depression, suicidality, induction of yi, GI upset, headaches, activation, sexual side effects, sedation, potential drug interactions, and others.     -Utilize p.r.n. Atarax as needed for breakthrough anxiety        Aware of 24 hour and weekend coverage for urgent situations accessed by calling [facility] main practice number  Continue psychotherapy with therapist    Diagnoses and all orders for this visit:    Depression with anxiety        Current Outpatient Medications on File Prior to Visit   Medication Sig Dispense Refill    gabapentin (NEURONTIN) 300 mg capsule Take 300 mg by mouth 3 (three) times a day Only takes 200 mg once a day at night  1    hydrOXYzine HCL (ATARAX) 25 mg tablet Take 1 tablet (25 mg total) by mouth every 6 (six) hours as needed for anxiety 90 tablet 1    meloxicam (MOBIC) 15 mg tablet TAKE 1 TABLET BY MOUTH EVERY DAY AS NEEDED (NOT DAILY USE)  2    methylPREDNISolone 4 MG tablet therapy pack use as directed for 6 days      morphine (MS CONTIN) 15 mg 12 hr tablet 15 mg every 12 (twelve) hours  0    oxyCODONE (ROXICODONE) 5 mg immediate release tablet Take 5 mg by mouth 2 (two) times a day as needed  0    sertraline (ZOLOFT) 100 mg tablet Take 1 tablet (100 mg total) by mouth daily 90 tablet 0     No current facility-administered medications on file prior to visit. Psychotherapy Provided:     Individual psychotherapy provided: Supportive counseling provided.   Medication changes discussed with Sonya Jerry. Medication education provided to Sonya Pottsvinay. HPI ROS Appetite Changes and Sleep:     She reports normal sleep, adequate appetite, adequate energy level.  Denies homicidal ideation, denies suicidal ideation    Review Of Systems:      HPI ROS:               Medication Side Effects:  denies    Depression (10 worst): 2/10    Anxiety (10 worst): 4/10    Safety concerns (SI, HI, etc): Denies si and hi    Sleep: 7 hrs    Energy: fair    Appetite: 3 meals    Weight Change: denies        Mental Status Evaluation:    Appearance Adequate hygiene and grooming   Behavior calm and cooperative   Mood anxious and depressed  Depression Scale - 2 of 10 (0 = No depression)  Anxiety Scale - 4 of 10 (0 = No anxiety)   Speech Normal rate and volume   Affect mood-congruent   Thought Processes Goal directed and coherent   Thought Content Does not verbalize delusional material   Associations Tightly connected   Perceptual Disturbances Denies hallucinations and does not appear to be responding to internal stimuli   Risk Potential Suicidal/Homicidal Ideation - No evidence of suicidal or homicidal ideation and patient does not verbalize suicidal or homicidal ideation  Risk of Violence - No evidence of risk for violence found on assessment  Risk of Self Mutilation - No suicidal or homicidal ideation and No evidence of risk for self mutilation found on assessment   Orientation oriented to person, place, time/date and situation   Memory recent and remote memory grossly intact   Consciousness alert and awake   Attention/Concentration attention span and concentration are age appropriate   Insight intact   Judgement intact   Muscle Strength and Gait normal muscle strength and normal muscle tone, normal gait/station and normal balance   Motor Activity no abnormal movements   Language no difficulty naming common objects, no difficulty repeating a phrase, no difficulty writing a sentence   Fund of Knowledge adequate knowledge of current events  adequate fund of knowledge regarding past history  adequate fund of knowledge regarding vocabulary          Past Medical History:    Past Medical History:   Diagnosis Date    Carpal tunnel syndrome     Fibromyalgia     Lupus ([address])     Spinal stenosis         Past Surgical History:   Procedure Laterality Date     SECTION      KNEE SURGERY      Knee Arthroscopy (Therapeutic)     Allergies   Allergen Reactions    Epinephrine     Lidocaine Hives    Moxifloxacin Hives     avelox     Substance Abuse History:    Social History     Substance and Sexual Activity   Alcohol Use No     Social History     Substance and Sexual Activity   Drug Use No     Social History:    Social History     Socioeconomic History    Marital status: /Civil Union     Spouse name: Not on file    Number of children: Not on file    Years of education: Not on file    Highest education level: Not on file   Occupational History    Not on file   Tobacco Use    Smoking status: Never    Smokeless tobacco: Never    Tobacco comments:     Per [employer]; Former smoker   Substance and Sexual Activity    Alcohol use: No    Drug use: No    Sexual activity: Not on file   Other Topics Concern    Not on file   Social History Narrative    Not on file     Social Determinants of Health     Financial Resource Strain: Not on file   Food Insecurity: Not on file   Transportation Needs: Not on file   Physical Activity: Not on file   Stress: Not on file   Social Connections: Not on file   Intimate Partner Violence: Not on file   Housing Stability: Not on file     Family Psychiatric History:     Family History   Problem Relation Age of Onset    Ovarian cancer Sister      History Review: The following portions of the patient's history were reviewed and updated as appropriate: allergies, current medications, past family history, past medical history, past social history, past surgical history and problem list     OBJECTIVE:     Vital signs in last 24 hours:    There were no vitals filed for this visit. Laboratory Results:   Recent Labs (last 2 months):   No visits with results within 2 Month(s) from this visit. Latest known visit with results is:   Appointment on 10/20/2018   Component Date Value    Sodium 10/20/2018 138     Potassium 10/20/2018 4.2     Chloride 10/20/2018 106     CO2 10/20/2018 27     ANION GAP 10/20/2018 5     BUN 10/20/2018 11     Creatinine 10/20/2018 0.57 (L)     Glucose, Fasting 10/20/2018 79     Calcium 10/20/2018 10.2 (H)     Corrected Calcium 10/20/2018 10.4 (H)     AST 10/20/2018 18     ALT 10/20/2018 37     Alkaline Phosphatase 10/20/2018 56     Total Protein 10/20/2018 6.9     Albumin 10/20/2018 3.8     Total Bilirubin 10/20/2018 0.59     eGFR 10/20/2018 111     Cholesterol 10/20/2018 178     Triglycerides 10/20/2018 114     HDL, Direct 10/20/2018 50     LDL Calculated 10/20/2018 105 (H)     Non-HDL-Chol (CHOL-HDL) 10/20/2018 128     WBC 10/20/2018 5.83     RBC 10/20/2018 4.26     Hemoglobin 10/20/2018 12.2     Hematocrit 10/20/2018 39.5     MCV 10/20/2018 93     MCH 10/20/2018 28.6     MCHC 10/20/2018 30.9 (L)     RDW 10/20/2018 12.9     MPV 10/20/2018 10.8     Platelets 89/65/9560 341     nRBC 10/20/2018 0     Neutrophils Relative 10/20/2018 46     Immat GRANS % 10/20/2018 0     Lymphocytes Relative 10/20/2018 41     Monocytes Relative 10/20/2018 10     Eosinophils Relative 10/20/2018 2     Basophils Relative 10/20/2018 1     Neutrophils Absolute 10/20/2018 2.67     Immature Grans Absolute 10/20/2018 0.02     Lymphocytes Absolute 10/20/2018 2.39     Monocytes Absolute 10/20/2018 0.58     Eosinophils Absolute 10/20/2018 0.10     Basophils Absolute 10/20/2018 0.07      I have personally reviewed all pertinent laboratory/tests results.     Suicide/Homicide Risk Assessment:    Risk of Harm to Self:  Protective Factors: no current suicidal ideation, access to mental health treatment, being a parent, being , compliant with medications, compliant with mental health treatment, connection to community, connection to own children, contact with caregivers  Based on today's assessment, Jana Rae presents the following risk of harm to self: minimal    Risk of Harm to Others:  Based on today's assessment, Jana Rae presents the following risk of harm to others: none    The following interventions are recommended: therapy appointment in 1 weeks    Medications Risks/Benefits:      Risks, Benefits And Possible Side Effects Of Medications:    Discussed risks and benefits of treatment with patient including risk of suicidality, serotonin syndrome, increased QTc interval and SIADH related to treatment with antidepressants; Risk of induction of manic symptoms in certain patient populations and Risk of sedation, dizziness and CNS depression during treatment with Gabapentin     Controlled Medication Discussion:     Not applicable    Treatment Plan:    Due for update/Updated:   yes  Last treatment plan done 8/21/23, due 2/21/24    BAKARI Pena 11/13/23    This note was shared with patient.       Visit Time    Visit Start Time: 6  Visit Stop Time: 620  Total Visit Duration:  20 minutes

## 2024-01-03 ENCOUNTER — APPOINTMENT (EMERGENCY)
Dept: RADIOLOGY | Facility: HOSPITAL | Age: 54
End: 2024-01-03
Payer: COMMERCIAL

## 2024-01-03 ENCOUNTER — APPOINTMENT (EMERGENCY)
Dept: CT IMAGING | Facility: HOSPITAL | Age: 54
End: 2024-01-03
Payer: COMMERCIAL

## 2024-01-03 ENCOUNTER — HOSPITAL ENCOUNTER (EMERGENCY)
Facility: HOSPITAL | Age: 54
Discharge: HOME | End: 2024-01-03
Attending: EMERGENCY MEDICINE
Payer: COMMERCIAL

## 2024-01-03 VITALS
TEMPERATURE: 98.2 F | SYSTOLIC BLOOD PRESSURE: 123 MMHG | OXYGEN SATURATION: 95 % | HEART RATE: 65 BPM | DIASTOLIC BLOOD PRESSURE: 66 MMHG | RESPIRATION RATE: 16 BRPM | BODY MASS INDEX: 47.04 KG/M2 | WEIGHT: 291.45 LBS

## 2024-01-03 DIAGNOSIS — S39.012A STRAIN OF LUMBAR REGION, INITIAL ENCOUNTER: ICD-10-CM

## 2024-01-03 DIAGNOSIS — S63.502A SPRAIN OF LEFT WRIST, INITIAL ENCOUNTER: Primary | ICD-10-CM

## 2024-01-03 DIAGNOSIS — S16.1XXA STRAIN OF NECK MUSCLE, INITIAL ENCOUNTER: ICD-10-CM
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
## 2024-01-03 PROCEDURE — 73110 X-RAY EXAM OF WRIST: CPT

## 2024-01-03 PROCEDURE — G1004 CDSM NDSC: HCPCS

## 2024-01-03 PROCEDURE — 73070 X-RAY EXAM OF ELBOW: CPT

## 2024-01-03 PROCEDURE — 99284 EMERGENCY DEPT VISIT MOD MDM: CPT | Performed by: EMERGENCY MEDICINE

## 2024-01-03 PROCEDURE — 72100 X-RAY EXAM L-S SPINE 2/3 VWS: CPT

## 2024-01-03 PROCEDURE — 70450 CT HEAD/BRAIN W/O DYE: CPT

## 2024-01-03 PROCEDURE — 73030 X-RAY EXAM OF SHOULDER: CPT

## 2024-01-03 PROCEDURE — 99284 EMERGENCY DEPT VISIT MOD MDM: CPT

## 2024-01-03 PROCEDURE — 72125 CT NECK SPINE W/O DYE: CPT

## 2024-01-03 PROCEDURE — 70486 CT MAXILLOFACIAL W/O DYE: CPT
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
## 2024-02-01 ENCOUNTER — APPOINTMENT (OUTPATIENT)
Dept: RADIOLOGY | Facility: MEDICAL CENTER | Age: 54
End: 2024-02-01
Payer: COMMERCIAL

## 2024-02-01 DIAGNOSIS — M51.15 INTERVERTEBRAL DISC DISORDER WITH RADICULOPATHY OF THORACOLUMBAR REGION: ICD-10-CM

## 2024-02-01 PROCEDURE — 71101 X-RAY EXAM UNILAT RIBS/CHEST: CPT

## 2024-02-01 PROCEDURE — 72072 X-RAY EXAM THORAC SPINE 3VWS: CPT

## 2024-04-17 DIAGNOSIS — F41.8 DEPRESSION WITH ANXIETY: ICD-10-CM

## 2024-04-17 RX ORDER — SERTRALINE HYDROCHLORIDE 100 MG/1
100 TABLET, FILM COATED ORAL DAILY
Qty: 90 TABLET | Refills: 0 | Status: SHIPPED | OUTPATIENT
Start: 2024-04-17 | End: 2024-04-23 | Stop reason: SDUPTHER

## 2024-04-23 ENCOUNTER — TELEPHONE (OUTPATIENT)
Dept: PSYCHIATRY | Facility: CLINIC | Age: 54
End: 2024-04-23

## 2024-04-23 ENCOUNTER — TELEMEDICINE (OUTPATIENT)
Dept: PSYCHIATRY | Facility: CLINIC | Age: 54
End: 2024-04-23
Payer: COMMERCIAL

## 2024-04-23 DIAGNOSIS — F41.1 GENERALIZED ANXIETY DISORDER: Primary | ICD-10-CM

## 2024-04-23 PROCEDURE — 99214 OFFICE O/P EST MOD 30 MIN: CPT

## 2024-04-23 RX ORDER — SERTRALINE HYDROCHLORIDE 100 MG/1
100 TABLET, FILM COATED ORAL DAILY
Qty: 90 TABLET | Refills: 0 | Status: SHIPPED | OUTPATIENT
Start: 2024-04-23

## 2024-04-23 NOTE — PSYCH
Virtual Regular Visit    Verification of patient location:    Patient is located in the following state in which I hold an active license [place]    Problem List Items Addressed This Visit    None  Visit Diagnoses       Generalized anxiety disorder    -  Primary               Encounter provider BAKARI Kennedy    Provider located at    [facility]  [address] [facility] [place][note 2] [id number] [phone]    Recent Visits  No visits were found meeting these conditions.  Showing recent visits within past 7 days and meeting all other requirements  Future Appointments  No visits were found meeting these conditions.  Showing future appointments within next 150 days and meeting all other requirements         The patient was identified by name and date of birth. Georgie Garza was informed that this is a telemedicine visit and that the visit is being conducted throughthe Epic Embedded platform. She agrees to proceed..  My office door was closed. No one else was in the room.  She acknowledged consent and understanding of privacy and security of the video platform. The patient has agreed to participate and understands they can discontinue the visit at any time.    Patient is aware this is a billable service.     HPI     Current Outpatient Medications   Medication Sig Dispense Refill    gabapentin (NEURONTIN) 300 mg capsule Take 300 mg by mouth 3 (three) times a day Only takes 200 mg once a day at night  1    hydrOXYzine HCL (ATARAX) 25 mg tablet Take 1 tablet (25 mg total) by mouth every 6 (six) hours as needed for anxiety 90 tablet 1    meloxicam (MOBIC) 15 mg tablet TAKE 1 TABLET BY MOUTH EVERY DAY AS NEEDED (NOT DAILY USE) (Patient not taking: Reported on 1/3/2024)  2    methylPREDNISolone 4 MG tablet therapy pack use as directed for 6 days      morphine (MS CONTIN) 15 mg 12 hr tablet 15 mg every 12 (twelve) hours  0    oxyCODONE (ROXICODONE) 5 mg immediate  release tablet Take 5 mg by mouth 2 (two) times a day as needed  0    sertraline (ZOLOFT) 100 mg tablet Take 1 tablet (100 mg total) by mouth daily 90 tablet 0     No current facility-administered medications for this visit.       Review of Systems  Video Exam    There were no vitals filed for this visit.    Physical Exam   As a result of this visit, I have referred the patient for further respiratory evaluation. No      VIRTUAL VISIT DISCLAIMER    Georgie Garza acknowledges that she has consented to an online visit or consultation. She understands that the online visit is based solely on information provided by her, and that, in the absence of a face-to-face physical evaluation by the physician, the diagnosis she receives is both limited and provisional in terms of accuracy and completeness. This is not intended to replace a full medical face-to-face evaluation by the physician. Georgie Garza understands and accepts these terms.    MEDICATION MANAGEMENT NOTE        [facility]    Name and Date of Birth:  Georgie Garza 53 y.o. 1970 MRN: [id number]    Date of Visit: April 23, 2024    Allergies   Allergen Reactions    Epinephrine     Lidocaine Hives    Moxifloxacin Hives     avelox     SUBJECTIVE:    Georgie is seen today for a follow up for Generalized Anxiety Disorder. She reports that she continues to do fairly well since the last visit.  Georgie reports that her  got his job back, insurance is recently started, relieving some anxiety.  Her big stressor remains her  and his inability to work on improving relationship, patient continues to consider a divorce but has not taken any action yet.  States it is miserable living in the household with him but at the same time wants to keep the peace for her adult son.  Encouraged patient to weigh the pros and cons of the situation and make the best decision possible for herself and her son.  Reports  moderate anxiety due to family stress, denies any severe symptoms or panic attacks.  She reports symptoms are manageable with current Zoloft 100 mg daily and occasional as needed Atarax for breakthrough anxiety.  She denies side effects of medications.  Mood appears appropriate and controlled during encounter.  Denies SI.        She denies any side effects from medications.    PLAN:    -Continue Zoloft 100 mg daily   PARQ completed including serotonin syndrome, SIADH, worsening depression, suicidality, induction of yi, GI upset, headaches, activation, sexual side effects, sedation, potential drug interactions, and others.     -Utilize p.r.n. Atarax as needed for breakthrough anxiety        Aware of 24 hour and weekend coverage for urgent situations accessed by calling [facility] main practice number  Continue psychotherapy with therapist    Diagnoses and all orders for this visit:    Generalized anxiety disorder        Current Outpatient Medications on File Prior to Visit   Medication Sig Dispense Refill    gabapentin (NEURONTIN) 300 mg capsule Take 300 mg by mouth 3 (three) times a day Only takes 200 mg once a day at night  1    hydrOXYzine HCL (ATARAX) 25 mg tablet Take 1 tablet (25 mg total) by mouth every 6 (six) hours as needed for anxiety 90 tablet 1    meloxicam (MOBIC) 15 mg tablet TAKE 1 TABLET BY MOUTH EVERY DAY AS NEEDED (NOT DAILY USE) (Patient not taking: Reported on 1/3/2024)  2    methylPREDNISolone 4 MG tablet therapy pack use as directed for 6 days      morphine (MS CONTIN) 15 mg 12 hr tablet 15 mg every 12 (twelve) hours  0    oxyCODONE (ROXICODONE) 5 mg immediate release tablet Take 5 mg by mouth 2 (two) times a day as needed  0    sertraline (ZOLOFT) 100 mg tablet Take 1 tablet (100 mg total) by mouth daily 90 tablet 0     No current facility-administered medications on file prior to visit.       Psychotherapy Provided:     Individual psychotherapy provided: Supportive  counseling provided.  Medication changes discussed with Georgie.  Medication education provided to Georgie.     HPI ROS Appetite Changes and Sleep:     She reports normal sleep, adequate appetite, adequate energy level. Denies homicidal ideation, denies suicidal ideation    Review Of Systems:      HPI ROS:               Medication Side Effects:  denies    Depression (10 worst): 2/10    Anxiety (10 worst): 3/10    Safety concerns (SI, HI, etc): Denies si and hi    Sleep: 7 hrs    Energy: fair    Appetite: 3 meals    Weight Change: denies        Mental Status Evaluation:    Appearance Adequate hygiene and grooming   Behavior calm and cooperative   Mood anxious  Depression Scale - 2 of 10 (0 = No depression)  Anxiety Scale - 3 of 10 (0 = No anxiety)   Speech Normal rate and volume   Affect mood-congruent   Thought Processes Goal directed and coherent   Thought Content Does not verbalize delusional material   Associations Tightly connected   Perceptual Disturbances Denies hallucinations and does not appear to be responding to internal stimuli   Risk Potential Suicidal/Homicidal Ideation - No evidence of suicidal or homicidal ideation and patient does not verbalize suicidal or homicidal ideation  Risk of Violence - No evidence of risk for violence found on assessment  Risk of Self Mutilation - No suicidal or homicidal ideation and No evidence of risk for self mutilation found on assessment   Orientation oriented to person, place, time/date and situation   Memory recent and remote memory grossly intact   Consciousness alert and awake   Attention/Concentration attention span and concentration are age appropriate   Insight intact   Judgement intact   Muscle Strength and Gait normal muscle strength and normal muscle tone, normal gait/station and normal balance   Motor Activity no abnormal movements   Language no difficulty naming common objects, no difficulty repeating a phrase, no difficulty writing a sentence   Fund of  Knowledge adequate knowledge of current events  adequate fund of knowledge regarding past history  adequate fund of knowledge regarding vocabulary          Past Medical History:    Past Medical History:   Diagnosis Date    Carpal tunnel syndrome     Fibromyalgia     Lupus (HCC)     Spinal stenosis         Past Surgical History:   Procedure Laterality Date     SECTION      KNEE SURGERY      Knee Arthroscopy (Therapeutic)     Allergies   Allergen Reactions    Epinephrine     Lidocaine Hives    Moxifloxacin Hives     avelox     Substance Abuse History:    Social History     Substance and Sexual Activity   Alcohol Use No     Social History     Substance and Sexual Activity   Drug Use Yes    Types: Marijuana     Social History:    Social History     Socioeconomic History    Marital status: /Civil Union     Spouse name: Not on file    Number of children: Not on file    Years of education: Not on file    Highest education level: Not on file   Occupational History    Not on file   Tobacco Use    Smoking status: Never    Smokeless tobacco: Never    Tobacco comments:     Per [employer]; Former smoker   Substance and Sexual Activity    Alcohol use: No    Drug use: Yes     Types: Marijuana    Sexual activity: Not on file   Other Topics Concern    Not on file   Social History Narrative    Not on file     Social Determinants of Health     Financial Resource Strain: Not on file   Food Insecurity: Not on file   Transportation Needs: Not on file   Physical Activity: Not on file   Stress: Not on file   Social Connections: Not on file   Intimate Partner Violence: Not on file   Housing Stability: Not on file     Family Psychiatric History:     Family History   Problem Relation Age of Onset    Ovarian cancer Sister      History Review:The following portions of the patient's history were reviewed and updated as appropriate: allergies, current medications, past family history, past medical history, past social history, past  surgical history and problem list     OBJECTIVE:     Vital signs in last 24 hours:    There were no vitals filed for this visit.  Laboratory Results:   Recent Labs (last 2 months):   No visits with results within 2 Month(s) from this visit.   Latest known visit with results is:   Appointment on 10/20/2018   Component Date Value    Sodium 10/20/2018 138     Potassium 10/20/2018 4.2     Chloride 10/20/2018 106     CO2 10/20/2018 27     ANION GAP 10/20/2018 5     BUN 10/20/2018 11     Creatinine 10/20/2018 0.57 (L)     Glucose, Fasting 10/20/2018 79     Calcium 10/20/2018 10.2 (H)     Corrected Calcium 10/20/2018 10.4 (H)     AST 10/20/2018 18     ALT 10/20/2018 37     Alkaline Phosphatase 10/20/2018 56     Total Protein 10/20/2018 6.9     Albumin 10/20/2018 3.8     Total Bilirubin 10/20/2018 0.59     eGFR 10/20/2018 111     Cholesterol 10/20/2018 178     Triglycerides 10/20/2018 114     HDL, Direct 10/20/2018 50     LDL Calculated 10/20/2018 105 (H)     Non-HDL-Chol (CHOL-HDL) 10/20/2018 128     WBC 10/20/2018 5.83     RBC 10/20/2018 4.26     Hemoglobin 10/20/2018 12.2     Hematocrit 10/20/2018 39.5     MCV 10/20/2018 93     MCH 10/20/2018 28.6     MCHC 10/20/2018 30.9 (L)     RDW 10/20/2018 12.9     MPV 10/20/2018 10.8     Platelets 10/20/2018 341     nRBC 10/20/2018 0     Segmented % 10/20/2018 46     Immature Grans % 10/20/2018 0     Lymphocytes % 10/20/2018 41     Monocytes % 10/20/2018 10     Eosinophils Relative 10/20/2018 2     Basophils Relative 10/20/2018 1     Absolute Neutrophils 10/20/2018 2.67     Absolute Immature Grans 10/20/2018 0.02     Absolute Lymphocytes 10/20/2018 2.39     Absolute Monocytes 10/20/2018 0.58     Eosinophils Absolute 10/20/2018 0.10     Basophils Absolute 10/20/2018 0.07      I have personally reviewed all pertinent laboratory/tests results.    Suicide/Homicide Risk Assessment:    Risk of Harm to Self:  Protective Factors: no current suicidal ideation, access to mental health  treatment, being a parent, being , compliant with medications, compliant with mental health treatment, connection to community, connection to own children, contact with caregivers  Based on today's assessment, Georgie presents the following risk of harm to self: minimal    Risk of Harm to Others:  Based on today's assessment, Georgie presents the following risk of harm to others: none    The following interventions are recommended: therapy appointment in 1 weeks    Medications Risks/Benefits:      Risks, Benefits And Possible Side Effects Of Medications:    Discussed risks and benefits of treatment with patient including risk of suicidality, serotonin syndrome, increased QTc interval and SIADH related to treatment with antidepressants; Risk of induction of manic symptoms in certain patient populations and Risk of sedation, dizziness and CNS depression during treatment with Gabapentin     Controlled Medication Discussion:     Not applicable    Treatment Plan:    Due for update/Updated:   yes  Last treatment plan done 4/23, due 10/23/24    BAKARI Kennedy 04/23/24    This note was shared with patient.      Visit Time    Visit Start Time: 125  Visit Stop Time: 140  Total Visit Duration:  15 minutes

## 2024-04-23 NOTE — TELEPHONE ENCOUNTER
Spoke with Georgie, the insurance listed in her chart is coming up inactive.  She said it should be valid.  I suggested that she have her  check into this with his human rescources

## 2024-04-23 NOTE — BH TREATMENT PLAN
I have reviewed labs. Agree she may still have fluid on board indicated by proBNP elevation. She had significant wheezing by exam last I saw her. Try to catch up with her by phone today but could not reach and left voicemail to call back to discuss. I see that her Lasix was increased for a few days in a row by Dr. Enrique Cooper in my absence. Would like to see if she is feeling better with this. We had scheduled her with short interval follow-up with Sera, due to be seen in about 1 month. Let me know when she calls back and where she can be reached.   GABINO TREATMENT PLAN (Medication Management Only)        [facility]    Name and Date of Birth:  Georgie Garza 53 y.o. 1970  Date of Treatment Plan: April 23, 2024  Diagnosis/Diagnoses:    1. Generalized anxiety disorder    2. Depression with anxiety      Strengths/Personal Resources for Self-Care: supportive friends, taking medications as prescribed, ability to adapt to life changes, ability to communicate needs.  Area/Areas of need (in own words): anxiety symptoms, depressive symptoms  1. Long Term Goal: continue improvement in acceptable anxiety level.  Target Date:6 months - 10/23/2024  Person/Persons responsible for completion of goal: Georgie  2.  Short Term Objective (s) - How will we reach this goal?:   A. Provider new recommended medication/dosage changes and/or continue medication(s): continue current medications as prescribed.  B. Attend medication management appointments regularly.  C. Take psychiatric medications responsibly.  Target Date:6 months - 10/23/2024  Person/Persons Responsible for Completion of Goal: Georgie  Progress Towards Goals: continuing treatment  Treatment Modality: medication management every 3 months  Review due 180 days from date of this plan: 6 months - 10/23/2024  Expected length of service: ongoing treatment  My Physician/PA/NP and I have developed this plan together and I agree to work on the goals and objectives. I understand the treatment goals that were developed for my treatment.

## 2024-06-03 ENCOUNTER — OFFICE VISIT (OUTPATIENT)
Dept: URGENT CARE | Facility: CLINIC | Age: 54
End: 2024-06-03
Payer: COMMERCIAL

## 2024-06-03 VITALS
DIASTOLIC BLOOD PRESSURE: 80 MMHG | OXYGEN SATURATION: 97 % | BODY MASS INDEX: 47.09 KG/M2 | TEMPERATURE: 97.4 F | SYSTOLIC BLOOD PRESSURE: 132 MMHG | HEART RATE: 71 BPM | WEIGHT: 293 LBS | HEIGHT: 66 IN

## 2024-06-03 DIAGNOSIS — Z23 ENCOUNTER FOR IMMUNIZATION: ICD-10-CM

## 2024-06-03 DIAGNOSIS — S61.251A OPEN WOUND OF LEFT INDEX FINGER DUE TO CAT BITE: Primary | ICD-10-CM
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
## 2024-06-03 PROCEDURE — 90471 IMMUNIZATION ADMIN: CPT

## 2024-06-03 PROCEDURE — G0382 LEV 3 HOSP TYPE B ED VISIT: HCPCS

## 2024-06-03 PROCEDURE — 90715 TDAP VACCINE 7 YRS/> IM: CPT

## 2024-06-03 PROCEDURE — S9083 URGENT CARE CENTER GLOBAL: HCPCS

## 2024-06-03 RX ORDER — AMOXICILLIN AND CLAVULANATE POTASSIUM 875; 125 MG/1; MG/1
1 TABLET, FILM COATED ORAL EVERY 12 HOURS SCHEDULED
Qty: 14 TABLET | Refills: 0 | Status: SHIPPED | OUTPATIENT
Start: 2024-06-03 | End: 2024-06-10

## 2024-06-03 NOTE — PROGRESS NOTES
[facility]        NAME: Georgie Garza is a 53 y.o. female  : 1970    MRN: [id number]  DATE: Nickie 3, 2024  TIME: 6:45 PM    Assessment and Plan   Open wound of left index finger due to cat bite [S61.251A, W55.01XA]  1. Open wound of left index finger due to cat bite  amoxicillin-clavulanate (AUGMENTIN) 875-125 mg per tablet      2. Encounter for immunization  Tdap Vaccine greater than or equal to 6yo        Animal bite form filled out.  TDAP updated today.   Strict ER precautions discussed with patient.     Patient Instructions     Antibiotics as directed.  Cleanse wounds with mild soap and water.  Thin layer bacitracin ointment.  Monitor for signs of infection which include fever/chills, increased redness, warmth, pain, drainage, streaking and follow up if these symptoms occur.     Follow up with PCP in 3-5 days.  Proceed to the ER with worsening symptoms.    Chief Complaint     Chief Complaint   Patient presents with    Cat Bite     Left hand index finger. The cat belongs to the patient and the cat is up to part with the shots. Cat bit patient this morning. The finger is swollen and punctured, the right forearm has been puncture too. Does not remember her last Tetanus shot.          History of Present Illness       The patient presents today with complaints of L index finger pain, swelling, and wound after being bitten by her cat this morning. She also has superficial scratches to her R forearm. She states the cat is UTD on their vaccines, she is unsure of her last TDAP. She states the swelling has increased to her L index finger since this morning, and is also oozing from the wound. Denies erythema, fever/chills, streaking. Is able to flex and extend her finger, but is tight due to swelling.         Review of Systems   Review of Systems   Constitutional:  Negative for chills and fever.   Skin:  Positive for wound (L index finger, R forearm).         Current Medications       Current Outpatient  "Medications:     amoxicillin-clavulanate (AUGMENTIN) 875-125 mg per tablet, Take 1 tablet by mouth every 12 (twelve) hours for 7 days, Disp: 14 tablet, Rfl: 0    gabapentin (NEURONTIN) 300 mg capsule, Take 300 mg by mouth 3 (three) times a day Only takes 200 mg once a day at night, Disp: , Rfl: 1    hydrOXYzine HCL (ATARAX) 25 mg tablet, Take 1 tablet (25 mg total) by mouth every 6 (six) hours as needed for anxiety, Disp: 90 tablet, Rfl: 1    meloxicam (MOBIC) 15 mg tablet, TAKE 1 TABLET BY MOUTH EVERY DAY AS NEEDED (NOT DAILY USE) (Patient not taking: Reported on 1/3/2024), Disp: , Rfl: 2    methylPREDNISolone 4 MG tablet therapy pack, use as directed for 6 days, Disp: , Rfl:     morphine (MS CONTIN) 15 mg 12 hr tablet, 15 mg every 12 (twelve) hours, Disp: , Rfl: 0    oxyCODONE (ROXICODONE) 5 mg immediate release tablet, Take 5 mg by mouth 2 (two) times a day as needed, Disp: , Rfl: 0    sertraline (ZOLOFT) 100 mg tablet, Take 1 tablet (100 mg total) by mouth daily, Disp: 90 tablet, Rfl: 0    Current Allergies     Allergies as of 2024 - Reviewed 2024   Allergen Reaction Noted    Epinephrine  2016    Lidocaine Hives 2018    Moxifloxacin Hives             The following portions of the patient's history were reviewed and updated as appropriate: allergies, current medications, past family history, past medical history, past social history, past surgical history and problem list.     Past Medical History:   Diagnosis Date    Carpal tunnel syndrome     Fibromyalgia     Lupus (HCC)     Spinal stenosis        Past Surgical History:   Procedure Laterality Date     SECTION      KNEE SURGERY      Knee Arthroscopy (Therapeutic)       Family History   Problem Relation Age of Onset    Ovarian cancer Sister          Medications have been verified.        Objective   /80   Pulse 71   Temp (!) 97.4 °F (36.3 °C)   Ht 5' 6\" (1.676 m)   Wt 133 kg (294 lb)   SpO2 97%   BMI 47.45 kg/m²      "   Physical Exam     Physical Exam  Vitals and nursing note reviewed.   Constitutional:       General: She is not in acute distress.     Appearance: Normal appearance.   HENT:      Head: Normocephalic and atraumatic.      Right Ear: External ear normal.      Left Ear: External ear normal.      Mouth/Throat:      Mouth: Mucous membranes are moist.   Eyes:      Pupils: Pupils are equal, round, and reactive to light.   Cardiovascular:      Rate and Rhythm: Normal rate.   Pulmonary:      Effort: Pulmonary effort is normal.   Skin:     General: Skin is warm and dry.      Findings: Wound present.      Comments: Superficial scratches to R forearm.  Bite wounds at base of L index finger with moderate swelling. Mild erythema. No drainage currently, warmth, or streaking. Sensation intact. Brisk capillary refill. Able to flex and extend finger.    Neurological:      Mental Status: She is alert and oriented to person, place, and time. Mental status is at baseline.   Psychiatric:         Mood and Affect: Mood normal.         Behavior: Behavior normal.

## 2024-06-03 NOTE — PATIENT INSTRUCTIONS
Antibiotics as directed.  Cleanse wounds with mild soap and water.  Thin layer bacitracin ointment.  Monitor for signs of infection which include fever/chills, increased redness, warmth, pain, drainage, streaking and follow up if these symptoms occur.     Follow up with PCP in 3-5 days.  Proceed to the ER with worsening symptoms.

## 2024-07-25 ENCOUNTER — OFFICE VISIT (OUTPATIENT)
Dept: URGENT CARE | Facility: CLINIC | Age: 54
End: 2024-07-25
Payer: COMMERCIAL

## 2024-07-25 VITALS
OXYGEN SATURATION: 96 % | DIASTOLIC BLOOD PRESSURE: 74 MMHG | HEART RATE: 112 BPM | RESPIRATION RATE: 18 BRPM | SYSTOLIC BLOOD PRESSURE: 160 MMHG | TEMPERATURE: 97.9 F | BODY MASS INDEX: 46.83 KG/M2 | WEIGHT: 290.13 LBS

## 2024-07-25 DIAGNOSIS — L24.9 IRRITANT CONTACT DERMATITIS, UNSPECIFIED TRIGGER: Primary | ICD-10-CM

## 2024-07-25 PROCEDURE — G0382 LEV 3 HOSP TYPE B ED VISIT: HCPCS | Performed by: PHYSICIAN ASSISTANT

## 2024-07-25 PROCEDURE — S9083 URGENT CARE CENTER GLOBAL: HCPCS | Performed by: PHYSICIAN ASSISTANT

## 2024-07-25 RX ORDER — PREDNISONE 20 MG/1
TABLET ORAL
Qty: 24 TABLET | Refills: 0 | Status: SHIPPED | OUTPATIENT
Start: 2024-07-25 | End: 2024-08-06

## 2024-07-25 NOTE — PROGRESS NOTES
[facility] Now        NAME: Georgie Garza is a 53 y.o. female  : 1970    MRN: [id number]  DATE: 2024  TIME: 3:15 PM    Assessment and Plan   Irritant contact dermatitis, unspecified trigger [L24.9]  1. Irritant contact dermatitis, unspecified trigger  predniSONE 20 mg tablet            Patient Instructions     Patient Instructions   Recommended oral steroid. Advised to continue with hydrocortisone cream.     Follow up with PCP in 3-5 days.  Proceed to  ER if symptoms worsen.    If tests are performed, our office will contact you with results only if changes need to made to the care plan discussed with you at the visit. You can review your full results on [facility].        Chief Complaint     Chief Complaint   Patient presents with    Urticaria     Right wrist itching 1 week ago, spot on check, chin, groin and trunk. Itching. Painful, no drainage. No fever or chills. No one else at home has rash. Possible exposure to hand foot mouth from grandson. Using steroid cream with some relief. No changes in detergents, food, routine or topicals.          History of Present Illness       Urticaria  This is a new problem. The current episode started in the past 7 days. The problem is unchanged. The affected locations include the chest, face and right wrist. The rash is characterized by redness, itchiness and pain. It is unknown if there was an exposure to a precipitant. Pertinent negatives include no cough, facial edema, fatigue, fever, joint pain, shortness of breath or sore throat. Past treatments include topical steroids. The treatment provided mild relief.       Review of Systems   Review of Systems   Constitutional:  Negative for fatigue and fever.   HENT:  Negative for sore throat.    Respiratory:  Negative for cough and shortness of breath.    Musculoskeletal:  Negative for joint pain.   Skin:  Positive for rash.   All other systems reviewed and are negative.        Current Medications        Current Outpatient Medications:     gabapentin (NEURONTIN) 300 mg capsule, Take 300 mg by mouth 3 (three) times a day Only takes 200 mg once a day at night, Disp: , Rfl: 1    hydrOXYzine HCL (ATARAX) 25 mg tablet, Take 1 tablet (25 mg total) by mouth every 6 (six) hours as needed for anxiety, Disp: 90 tablet, Rfl: 1    morphine (MS CONTIN) 15 mg 12 hr tablet, 15 mg every 12 (twelve) hours, Disp: , Rfl: 0    predniSONE 20 mg tablet, Take 3 tablets (60 mg total) by mouth daily for 4 days, THEN 2 tablets (40 mg total) daily for 4 days, THEN 1 tablet (20 mg total) daily for 4 days., Disp: 24 tablet, Rfl: 0    sertraline (ZOLOFT) 100 mg tablet, Take 1 tablet (100 mg total) by mouth daily, Disp: 90 tablet, Rfl: 0    meloxicam (MOBIC) 15 mg tablet, TAKE 1 TABLET BY MOUTH EVERY DAY AS NEEDED (NOT DAILY USE) (Patient not taking: Reported on 1/3/2024), Disp: , Rfl: 2    methylPREDNISolone 4 MG tablet therapy pack, use as directed for 6 days (Patient not taking: Reported on 2024), Disp: , Rfl:     oxyCODONE (ROXICODONE) 5 mg immediate release tablet, Take 5 mg by mouth 2 (two) times a day as needed (Patient not taking: Reported on 2024), Disp: , Rfl: 0    Current Allergies     Allergies as of 2024 - Reviewed 2024   Allergen Reaction Noted    Epinephrine  2016    Lidocaine Hives 2018    Moxifloxacin Hives             The following portions of the patient's history were reviewed and updated as appropriate: allergies, current medications, past family history, past medical history, past social history, past surgical history and problem list.     Past Medical History:   Diagnosis Date    Carpal tunnel syndrome     Fibromyalgia     Lupus (HCC)     Spinal stenosis        Past Surgical History:   Procedure Laterality Date     SECTION      KNEE SURGERY      Knee Arthroscopy (Therapeutic)       Family History   Problem Relation Age of Onset    Ovarian cancer Sister          Medications  have been verified.        Objective   /74   Pulse (!) 112   Temp 97.9 °F (36.6 °C)   Resp 18   Wt 132 kg (290 lb 2 oz)   SpO2 96%   BMI 46.83 kg/m²        Physical Exam     Physical Exam  Vitals and nursing note reviewed.   Constitutional:       Appearance: Normal appearance.   Skin:     General: Skin is warm and dry.      Capillary Refill: Capillary refill takes less than 2 seconds.      Comments: Patches of erythematous raised rashes on the right wrist, the neck and chest. Larger patches on the abdomen and the groin. No drainage or vesicle formation.    Neurological:      General: No focal deficit present.      Mental Status: She is alert and oriented to person, place, and time.   Psychiatric:         Mood and Affect: Mood normal.         Behavior: Behavior normal.

## 2024-07-25 NOTE — PATIENT INSTRUCTIONS
Recommended oral steroid. Advised to continue with hydrocortisone cream.     Follow up with PCP in 3-5 days.  Proceed to  ER if symptoms worsen.    If tests are performed, our office will contact you with results only if changes need to made to the care plan discussed with you at the visit. You can review your full results on [facility].

## 2024-08-22 ENCOUNTER — TELEMEDICINE (OUTPATIENT)
Dept: FAMILY MEDICINE CLINIC | Facility: CLINIC | Age: 54
End: 2024-08-22
Payer: COMMERCIAL

## 2024-08-22 DIAGNOSIS — U07.1 COVID-19: Primary | ICD-10-CM

## 2024-08-22 PROCEDURE — 99203 OFFICE O/P NEW LOW 30 MIN: CPT | Performed by: FAMILY MEDICINE

## 2024-08-22 RX ORDER — DEXTROMETHORPHAN HYDROBROMIDE AND PROMETHAZINE HYDROCHLORIDE 15; 6.25 MG/5ML; MG/5ML
5 SYRUP ORAL 4 TIMES DAILY PRN
Qty: 240 ML | Refills: 0 | Status: SHIPPED | OUTPATIENT
Start: 2024-08-22

## 2024-08-22 NOTE — LETTER
August 22, 2024     Patient: Georgie Garza  YOB: 1970  Date of Visit: 8/22/2024      To Whom it May Concern:    Georgie Garza is under my professional care. Georgie was seen in my office on 8/22/2024. Georgie may return to work on 8/23/24 .    If you have any questions or concerns, please don't hesitate to call.         Sincerely,          Lorin Galeas MD        CC: No Recipients

## 2024-08-22 NOTE — PROGRESS NOTES
COVID-19 Outpatient Progress Note  Name: Georgie Garza      : 1970      MRN: [id number]  Encounter Provider: Lorin Galeas MD  Encounter Date: 2024   Encounter department: [facility]    Assessment & Plan   1. COVID-19  -     promethazine-dextromethorphan (PHENERGAN-DM) 6.25-15 mg/5 mL oral syrup; Take 5 mL by mouth 4 (four) times a day as needed for cough    Disposition:     Discussed symptom directed medication options with patient.   Return to work as long as afebrile     I have spent a total time of 10 minutes on the day of the encounter for this patient including     Day 6 of symptoms - too far out for Paxlovid or antiviral therapies       Encounter provider: Lorin Galeas MD     Provider located at: [facility]  [address]  [facility] [address]     Recent Visits  No visits were found meeting these conditions.  Showing recent visits within past 7 days and meeting all other requirements  Today's Visits  Date Type Provider Dept   24 Telemedicine Lorin Galeas MD [facility]   Showing today's visits and meeting all other requirements  Future Appointments  No visits were found meeting these conditions.  Showing future appointments within next 150 days and meeting all other requirements    History of Present Illness      This virtual check-in was done via [employer] and patient was informed that this is a secure, HIPAA-compliant platform. She agrees to proceed.    Patient agrees to participate in a virtual check in via telephone or video visit instead of presenting to the office to address urgent/immediate medical needs. Patient is aware this is a billable service. She acknowledged consent and understanding of privacy and security of the video platform. The patient has agreed to participate and understands they can discontinue the visit at any time.    After connecting through [employer], the patient  "was identified by name and date of birth. Georgie Garza was informed that this was a telemedicine visit and that the exam was being conducted confidentially over secure lines. My office door was closed. No one else was in the room. Georgie Garza acknowledged consent and understanding of privacy and security of the telemedicine visit. I informed the patient that I have reviewed her record in Epic and presented the opportunity for her to ask any questions regarding the visit today. The patient agreed to participate.     Verification of patient location:  Patient is located in the following state in which I hold an active license: [place]    Subjective:   Georgie Garza is a 54 y.o. female who has been screened for COVID-19. Patient's symptoms include fever (subjective), malaise, sore throat, cough, shortness of breath and myalgias. Patient denies congestion and chest tightness.     - Date of symptom onset: 8/16/2024  - Date of positive COVID-19 test: 8/21/2024. Type of test: Home antigen. Patient with typical symptoms of COVID-19 and they attest that they were positive on home rapid antigen testing. Image of positive result is not able to be uploaded into their chart.     COVID-19 vaccination status: Fully vaccinated (primary series)    She is not taking anything.     No results found for: \"SARSCOV2\", \"[id number]\", \"SARSCORONAVI\", \"CORONAVIRUSR\", \"SARSCOVAG\", \"SARSCOVAGH\"    Review of Systems   Constitutional:  Positive for fever (subjective).   HENT:  Positive for sore throat. Negative for congestion.    Respiratory:  Positive for cough and shortness of breath. Negative for chest tightness.    Musculoskeletal:  Positive for myalgias.     Objective     There were no vitals taken for this visit.    Physical Exam  Vitals and nursing note reviewed.   Constitutional:       General: She is not in acute distress.     Appearance: She is well-developed. She is not ill-appearing, toxic-appearing or diaphoretic.   HENT:      " Head: Normocephalic and atraumatic.   Pulmonary:      Effort: Pulmonary effort is normal. No respiratory distress.   Neurological:      Mental Status: She is alert.   Psychiatric:         Behavior: Behavior normal.         Thought Content: Thought content normal.         Judgment: Judgment normal.

## 2024-08-28 DIAGNOSIS — F41.1 GENERALIZED ANXIETY DISORDER: ICD-10-CM

## 2024-08-28 RX ORDER — SERTRALINE HYDROCHLORIDE 100 MG/1
100 TABLET, FILM COATED ORAL DAILY
Qty: 90 TABLET | Refills: 1 | Status: SHIPPED | OUTPATIENT
Start: 2024-08-28

## 2024-08-28 RX ORDER — SERTRALINE HYDROCHLORIDE 100 MG/1
100 TABLET, FILM COATED ORAL DAILY
Qty: 14 TABLET | Refills: 0 | Status: SHIPPED | OUTPATIENT
Start: 2024-08-28

## 2024-08-28 NOTE — TELEPHONE ENCOUNTER
Patient is out of the medication. Please send to mail order and short supply to local pharmacy.     Reason for call:   [x] Refill   [] Prior Auth  [] Other:     Office:   [] PCP/Provider -   [x] [facility]  Authorized By: BAKARI Kennedy    Medication: sertraline (ZOLOFT) 100 mg tablet     Dose/Frequency: Take 1 tablet (100 mg total) by mouth daily     Quantity: 90 tablet     Pharmacy: [facility] - [place]cal[place] - [facility]     Does the patient have enough for 3 days?   [] Yes   [x] No - Send as HP to POD

## 2024-09-04 DIAGNOSIS — F41.1 GENERALIZED ANXIETY DISORDER: ICD-10-CM

## 2024-09-04 RX ORDER — SERTRALINE HYDROCHLORIDE 100 MG/1
100 TABLET, FILM COATED ORAL DAILY
Qty: 14 TABLET | Refills: 0 | Status: SHIPPED | OUTPATIENT
Start: 2024-09-04

## 2024-11-03 ENCOUNTER — HOSPITAL ENCOUNTER (EMERGENCY)
Facility: HOSPITAL | Age: 54
Discharge: HOME | End: 2024-11-03
Attending: EMERGENCY MEDICINE
Payer: COMMERCIAL

## 2024-11-03 VITALS
DIASTOLIC BLOOD PRESSURE: 77 MMHG | HEART RATE: 92 BPM | SYSTOLIC BLOOD PRESSURE: 174 MMHG | TEMPERATURE: 97.9 F | OXYGEN SATURATION: 98 % | RESPIRATION RATE: 17 BRPM

## 2024-11-03 DIAGNOSIS — G89.29 CHRONIC PAIN: Primary | ICD-10-CM

## 2024-11-03 PROCEDURE — 99284 EMERGENCY DEPT VISIT MOD MDM: CPT | Performed by: EMERGENCY MEDICINE

## 2024-11-03 PROCEDURE — 99283 EMERGENCY DEPT VISIT LOW MDM: CPT

## 2024-11-03 PROCEDURE — 96375 TX/PRO/DX INJ NEW DRUG ADDON: CPT

## 2024-11-03 PROCEDURE — 96374 THER/PROPH/DIAG INJ IV PUSH: CPT

## 2024-11-03 RX ORDER — KETOROLAC TROMETHAMINE 30 MG/ML
15 INJECTION, SOLUTION INTRAMUSCULAR; INTRAVENOUS ONCE
Status: COMPLETED | OUTPATIENT
Start: 2024-11-03 | End: 2024-11-03

## 2024-11-03 RX ORDER — NAPROXEN 500 MG/1
500 TABLET ORAL 2 TIMES DAILY WITH MEALS
Qty: 14 TABLET | Refills: 0 | Status: SHIPPED | OUTPATIENT
Start: 2024-11-03 | End: 2024-11-10

## 2024-11-03 RX ORDER — HYDROMORPHONE HCL/PF 1 MG/ML
0.5 SYRINGE (ML) INJECTION ONCE
Status: COMPLETED | OUTPATIENT
Start: 2024-11-03 | End: 2024-11-03

## 2024-11-03 RX ORDER — METHOCARBAMOL 500 MG/1
750 TABLET, FILM COATED ORAL ONCE
Status: COMPLETED | OUTPATIENT
Start: 2024-11-03 | End: 2024-11-03

## 2024-11-03 RX ORDER — METHOCARBAMOL 500 MG/1
500 TABLET, FILM COATED ORAL 2 TIMES DAILY
Qty: 14 TABLET | Refills: 0 | Status: SHIPPED | OUTPATIENT
Start: 2024-11-03 | End: 2024-11-10

## 2024-11-03 RX ADMIN — HYDROMORPHONE HYDROCHLORIDE 0.5 MG: 1 INJECTION, SOLUTION INTRAMUSCULAR; INTRAVENOUS; SUBCUTANEOUS at 15:35

## 2024-11-03 RX ADMIN — KETOROLAC TROMETHAMINE 15 MG: 30 INJECTION, SOLUTION INTRAMUSCULAR; INTRAVENOUS at 15:34

## 2024-11-03 RX ADMIN — METHOCARBAMOL 750 MG: 500 TABLET ORAL at 16:00

## 2024-11-03 RX ADMIN — DICLOFENAC SODIUM TOPICAL GEL, 1% 4 G: 10 GEL TOPICAL at 15:38

## 2024-11-03 NOTE — ED PROVIDER NOTES
Time reflects when diagnosis was documented in both MDM as applicable and the Disposition within this note       Time User Action Codes Description Comment    11/3/2024  3:57 PM Micheal Sutherland [G89.29] Chronic pain           ED Disposition       ED Disposition   Discharge    Condition   Stable    Date/Time   Sun Nov 3, 2024  3:57 PM    Comment   Georgie Bradshawclaudio discharge to home/self care.                   Assessment & Plan       Medical Decision Making  54-year-old female presenting with worsening of her chronic neck pain.    Discussed with patient methods of pain management and what works for her.  It was decided that we would treat patient's pain with IV Dilaudid and Toradol.  Patient will also have Voltaren gel placed on the left neck region and also receive Robaxin as a muscle relaxant.  Patient states that her pain had been helped with these medications but not fully gone away however patient is comfortable with discharge.    No acute concern for new or worsening injury.  Patient will be given prescription for naproxen and Robaxin for 1 week.  Patient does have a follow-up appointment with pain management this Tuesday.    Patient comfortable with this plan and comfortable with discharge.  Patient's adult son is at bedside who will be driving patient.  Patient discharged.    Risk  Prescription drug management.             Medications   HYDROmorphone (DILAUDID) injection 0.5 mg (0.5 mg Intravenous Given 11/3/24 1535)   ketorolac (TORADOL) injection 15 mg (15 mg Intravenous Given 11/3/24 1534)   Diclofenac Sodium (VOLTAREN) 1 % topical gel 4 g (4 g Topical Given 11/3/24 1538)   methocarbamol (ROBAXIN) tablet 750 mg (750 mg Oral Given 11/3/24 1600)       ED Risk Strat Scores                                               History of Present Illness       Chief Complaint   Patient presents with    Pain     Dx with pinch nerve in neck by ortho and is being sent for PT but here because pain keeps getting worse.  Pt knows she is on pain mngmt program and we can't give her medications       Past Medical History:   Diagnosis Date    Carpal tunnel syndrome     Fibromyalgia     Lupus     Spinal stenosis       Past Surgical History:   Procedure Laterality Date     SECTION      KNEE SURGERY      Knee Arthroscopy (Therapeutic)      Family History   Problem Relation Age of Onset    Ovarian cancer Sister       Social History     Tobacco Use    Smoking status: Never    Smokeless tobacco: Never    Tobacco comments:     Per [employer]; Former smoker   Vaping Use    Vaping status: Never Used   Substance Use Topics    Alcohol use: No    Drug use: Yes     Types: Marijuana      E-Cigarette/Vaping    E-Cigarette Use Never User     Comments medical card       E-Cigarette/Vaping Substances      I have reviewed and agree with the history as documented.     54-year-old female with chronic neck pain seeing orthopedics and pain management presenting to the ED with worsening of neck pain.  Patient states that recently she has been getting weaned off of her oxycodone at home but does still take oral morphine.  Patient's pain has been flaring up worse over the past week.  Pain is described as dull, 10 out of 10, in the neck radiating to the left shoulder.  Patient was seen by orthopedics this week who stated that patient needs to go back to her pain management specialist for trigger point injections.  Patient has an appointment on Tuesday for trigger point injections but states her pain is to the point where she can no longer sleep or go to work and needed to be seen earlier than later.  Patient's denying any changes in the pain just states that it is the same pain but now that she is weaning off Oxy as it is becoming harder to manage.  Patient has no recent injuries to the area and denies any difficulty breathing, headache, lightheadedness, dizziness, blurred vision.        Review of Systems   Constitutional:  Negative for chills and fever.    HENT:  Negative for congestion and rhinorrhea.    Respiratory:  Negative for chest tightness and shortness of breath.    Cardiovascular:  Negative for chest pain and palpitations.   Gastrointestinal:  Negative for abdominal pain, diarrhea, nausea and vomiting.   Genitourinary:  Negative for dysuria.   Musculoskeletal:  Positive for neck pain and neck stiffness. Negative for arthralgias, back pain, gait problem, joint swelling and myalgias.   Skin:  Negative for color change.   Neurological:  Negative for dizziness, weakness, light-headedness and numbness.   Psychiatric/Behavioral:  Negative for agitation and confusion.            Objective       ED Triage Vitals [11/03/24 1448]   Temperature Pulse Blood Pressure Respirations SpO2 Patient Position - Orthostatic VS   97.9 °F (36.6 °C) 92 (!) 174/77 17 98 % --      Temp Source Heart Rate Source BP Location FiO2 (%) Pain Score    Oral -- Right arm -- 10 - Worst Possible Pain      Vitals      Date and Time Temp Pulse SpO2 Resp BP Pain Score FACES Pain Rating User   11/03/24 1448 97.9 °F (36.6 °C) 92 98 % 17 174/77 10 - Worst Possible Pain -- RLN            Physical Exam  Constitutional:       General: She is in acute distress.      Appearance: She is obese.   HENT:      Head: Normocephalic and atraumatic.      Right Ear: External ear normal.      Left Ear: External ear normal.      Nose: Nose normal.      Mouth/Throat:      Pharynx: Oropharynx is clear.   Eyes:      Extraocular Movements: Extraocular movements intact.      Pupils: Pupils are equal, round, and reactive to light.   Neck:      Comments: No reproducible tenderness however patient describing muscle spasm like pain.  Cardiovascular:      Rate and Rhythm: Normal rate.      Pulses: Normal pulses.      Heart sounds: Normal heart sounds.   Pulmonary:      Effort: Pulmonary effort is normal.      Breath sounds: Normal breath sounds.   Abdominal:      Palpations: Abdomen is soft.   Musculoskeletal:          General: Normal range of motion.      Cervical back: Normal range of motion. No tenderness.   Skin:     General: Skin is warm.      Capillary Refill: Capillary refill takes less than 2 seconds.   Neurological:      General: No focal deficit present.      Mental Status: She is alert and oriented to person, place, and time.   Psychiatric:         Mood and Affect: Mood normal.         Behavior: Behavior normal.         Results Reviewed       None            No orders to display       Procedures    ED Medication and Procedure Management   Prior to Admission Medications   Prescriptions Last Dose Informant Patient Reported? Taking?   gabapentin (NEURONTIN) 300 mg capsule   Yes No   Sig: Take 300 mg by mouth 3 (three) times a day Only takes 200 mg once a day at night   hydrOXYzine HCL (ATARAX) 25 mg tablet   No No   Sig: Take 1 tablet (25 mg total) by mouth every 6 (six) hours as needed for anxiety   morphine (MS CONTIN) 15 mg 12 hr tablet   Yes No   Sig: 15 mg every 12 (twelve) hours   promethazine-dextromethorphan (PHENERGAN-DM) 6.25-15 mg/5 mL oral syrup   No No   Sig: Take 5 mL by mouth 4 (four) times a day as needed for cough   sertraline (ZOLOFT) 100 mg tablet   No No   Sig: Take 1 tablet (100 mg total) by mouth daily   sertraline (ZOLOFT) 100 mg tablet   No No   Sig: take 1 tablet by mouth once daily      Facility-Administered Medications: None     Discharge Medication List as of 11/3/2024  4:06 PM        START taking these medications    Details   methocarbamol (ROBAXIN) 500 mg tablet Take 1 tablet (500 mg total) by mouth 2 (two) times a day for 7 days, Starting Sun 11/3/2024, Until Sun 11/10/2024, Normal      naproxen (Naprosyn) 500 mg tablet Take 1 tablet (500 mg total) by mouth 2 (two) times a day with meals for 7 days, Starting Sun 11/3/2024, Until Sun 11/10/2024, Normal           CONTINUE these medications which have NOT CHANGED    Details   gabapentin (NEURONTIN) 300 mg capsule Take 300 mg by mouth 3 (three)  times a day Only takes 200 mg once a day at night, Starting Wed 9/19/2018, Historical Med      hydrOXYzine HCL (ATARAX) 25 mg tablet Take 1 tablet (25 mg total) by mouth every 6 (six) hours as needed for anxiety, Starting Mon 11/13/2023, Normal      morphine (MS CONTIN) 15 mg 12 hr tablet 15 mg every 12 (twelve) hours, Starting Fri 12/21/2018, Historical Med      promethazine-dextromethorphan (PHENERGAN-DM) 6.25-15 mg/5 mL oral syrup Take 5 mL by mouth 4 (four) times a day as needed for cough, Starting Thu 8/22/2024, Normal      !! sertraline (ZOLOFT) 100 mg tablet Take 1 tablet (100 mg total) by mouth daily, Starting Wed 8/28/2024, Normal      !! sertraline (ZOLOFT) 100 mg tablet take 1 tablet by mouth once daily, Starting Wed 9/4/2024, Normal       !! - Potential duplicate medications found. Please discuss with provider.        No discharge procedures on file.  ED SEPSIS DOCUMENTATION   Time reflects when diagnosis was documented in both MDM as applicable and the Disposition within this note       Time User Action Codes Description Comment    11/3/2024  3:57 PM Micheal York Add [G89.29] Chronic pain                  Micheal York MD  11/03/24 7723     Patient/Caregiver provided printed discharge information.

## 2024-11-03 NOTE — DISCHARGE INSTRUCTIONS
Follow-up with your pain management specialist as well as orthopedics for continued management of your pain.

## 2024-11-03 NOTE — ED ATTENDING ATTESTATION
11/3/2024  IJerzy DO, saw and evaluated the patient. I have discussed the patient with the resident/non-physician practitioner and agree with the resident's/non-physician practitioner's findings, Plan of Care, and MDM as documented in the resident's/non-physician practitioner's note, except where noted. All available labs and Radiology studies were reviewed.  I was present for key portions of any procedure(s) performed by the resident/non-physician practitioner and I was immediately available to provide assistance.       At this point I agree with the current assessment done in the Emergency Department.  I have conducted an independent evaluation of this patient a history and physical is as follows:    55 yo F in the ED for eval of acute on chronic left neck/shoulder pain. Hx of cervical radiculopathy. Follows with ortho and pain management, has pain management appointment in 2 days. Takes morphine ER daily, weaned herself off of breakthrough oxycodone 5 mg about 4 months ago. Symptoms similar to prior pain in the left neck going to the left shoulder.    On exam, tenderness to the left trapezius/upper back, rotator cuff of the left shoulder. Normal ROM of the left shoulder, elbow, wrist, hand. NVI left arm. Cap refill < 2 seconds.  Increased pain when she moves her left arm from the shoulder (abducts, flexes, internally rotates)    Symptoms likely from chronic cervical radiculopathy.  Pain mgmt with toradol, robaxin, dilaudid. Stable for d/c to f/u pain mgmt in 2 days. No symptoms suggestive of central cord syndrome or acute myelopathy    ED Course         Critical Care Time  Procedures

## 2024-12-04 ENCOUNTER — TELEPHONE (OUTPATIENT)
Age: 54
End: 2024-12-04

## 2024-12-04 NOTE — TELEPHONE ENCOUNTER
Patient contacted the office to schedule a follow up visit with provider. Patient is now scheduled for 12/6/24  at 1:30pm virtually.

## 2024-12-06 ENCOUNTER — TELEMEDICINE (OUTPATIENT)
Dept: PSYCHIATRY | Facility: CLINIC | Age: 54
End: 2024-12-06
Payer: COMMERCIAL

## 2024-12-06 DIAGNOSIS — Z63.0 MARITAL CONFLICT: ICD-10-CM

## 2024-12-06 DIAGNOSIS — F41.1 GENERALIZED ANXIETY DISORDER: Primary | ICD-10-CM

## 2024-12-06 PROCEDURE — 99214 OFFICE O/P EST MOD 30 MIN: CPT

## 2024-12-06 RX ORDER — SERTRALINE HYDROCHLORIDE 100 MG/1
100 TABLET, FILM COATED ORAL DAILY
Qty: 90 TABLET | Refills: 1 | Status: SHIPPED | OUTPATIENT
Start: 2024-12-06

## 2024-12-06 RX ORDER — OXYCODONE AND ACETAMINOPHEN 5; 325 MG/1; MG/1
1 TABLET ORAL EVERY 6 HOURS PRN
COMMUNITY
Start: 2024-11-29

## 2024-12-06 SDOH — SOCIAL STABILITY - SOCIAL INSECURITY: PROBLEMS IN RELATIONSHIP WITH SPOUSE OR PARTNER: Z63.0
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
## 2024-12-06 NOTE — BH TREATMENT PLAN
TREATMENT PLAN (Medication Management Only)        [facility]    Name and Date of Birth:  Georgie Garza 54 y.o. 1970  Date of Treatment Plan: December 6, 2024  Diagnosis/Diagnoses:    1. Generalized anxiety disorder    2. Marital conflict      Strengths/Personal Resources for Self-Care: supportive friends, taking medications as prescribed, ability to adapt to life changes, ability to communicate needs, ability to listen, ability to reason.  Area/Areas of need (in own words): anxiety symptoms, depressive symptoms, marital conflict   1. Long Term Goal: continue improvement in acceptable anxiety level.  Target Date:6 months - 6/6/2025  Person/Persons responsible for completion of goal: Georgie  2.  Short Term Objective (s) - How will we reach this goal?:   A. Provider new recommended medication/dosage changes and/or continue medication(s): continue current medications as prescribed.  B. Attend medication management appointments regularly.  C. Take psychiatric medications responsibly.  Target Date:6 months - 6/6/2025  Person/Persons Responsible for Completion of Goal: Georgie  Progress Towards Goals: continuing treatment  Treatment Modality: medication management every 3 months  Review due 180 days from date of this plan: 6 months - 6/6/2025  Expected length of service: ongoing treatment  My Physician/PA/NP and I have developed this plan together and I agree to work on the goals and objectives. I understand the treatment goals that were developed for my treatment.

## 2025-03-03 ENCOUNTER — TELEMEDICINE (OUTPATIENT)
Dept: PSYCHIATRY | Facility: CLINIC | Age: 55
End: 2025-03-03
Payer: COMMERCIAL

## 2025-03-03 DIAGNOSIS — F41.1 GENERALIZED ANXIETY DISORDER: Primary | ICD-10-CM

## 2025-03-03 DIAGNOSIS — Z63.0 MARITAL CONFLICT: ICD-10-CM

## 2025-03-03 PROCEDURE — 99214 OFFICE O/P EST MOD 30 MIN: CPT

## 2025-03-03 SDOH — SOCIAL STABILITY - SOCIAL INSECURITY: PROBLEMS IN RELATIONSHIP WITH SPOUSE OR PARTNER: Z63.0

## 2025-03-03 NOTE — PSYCH
MEDICATION MANAGEMENT NOTE    Name: Georgie Garza      : 1970      MRN: [id number]  Encounter Provider: BAKARI Kennedy  Encounter Date: 3/3/2025   Encounter department: [facility]    Insurance: Payor: BLUE CROSS / Plan: MISC BLUE CROSS / Product Type: Blue Fee for Service /      Reason for Visit: No chief complaint on file.  :  Assessment & Plan  Generalized anxiety disorder       -Continue Zoloft 100 mg daily   PARQ completed including serotonin syndrome, SIADH, worsening depression, suicidality, induction of yi, GI upset, headaches, activation, sexual side effects, sedation, potential drug interactions, and others.     -Utilize p.r.n. Atarax as needed for breakthrough anxiety     Marital conflict             Treatment Recommendations:    Educated about diagnosis and treatment modalities. Verbalizes understanding and agreement with the treatment plan.  Discussed self monitoring of symptoms, and symptom monitoring tools.  Discussed medications and if treatment adjustment was needed or desired.  Aware of 24 hour and weekend coverage for urgent situations accessed by calling [facility] main practice number  I am scheduling this patient out for greater than 3 months: No    Medications Risks/Benefits:      Risks, Benefits And Possible Side Effects Of Medications:    Risks, benefits, and possible side effects of medications explained to Georige and she (or legal representative) verbalizes understanding and agreement for treatment.    Controlled Medication Discussion:     Not applicable      History of Present Illness     Georgie is seen today for a follow up for anxiety. She reports that she continues to do fairly well since the last visit.  Georgie reports doing okay declines any medication changes needed at this appointment.  She continues her compliance with Zoloft 100 mg daily and tolerated no side effects and improvement in anxiety with  medication compared without.  Continues to discuss difficult relationship with her  who she has confronted about potential divorce, states her  pretty much ignored the comment and has not made any changes in her behaviors.  States he continues to stay in the basement or in his room all the time and engage a little conversation.  Describes how she feels much better and relaxed when he is out of the home and at work, believes to be better for both of them if they lived separately.  Patient only desire is for him to have a better relationship with her son.  Supportive counseling provided today.  She reports mild irritability and anxiety related to a stressor described above.  Denies any severe symptoms, denies any thoughts of self-harm or homicidal ideation.  Continues to recommend psychotherapy for further support.      She denies any side effects from current psychiatric medications.    HPI ROS Appetite Changes and Sleep:     She reports normal sleep, adequate appetite, low energy    Review Of Systems: A review of systems is obtained and is negative except for the pertinent positives listed in HPI/Subjective above.      Current Rating Scores:     None completed today.    Areas of Improvement: reviewed in HPI/Subjective Section and reviewed in Assessment and Plan Section        Past Medical History:   Diagnosis Date   • Carpal tunnel syndrome    • Fibromyalgia    • Lupus    • Spinal stenosis         Past Surgical History:   Procedure Laterality Date   •  SECTION     • KNEE SURGERY      Knee Arthroscopy (Therapeutic)     Allergies:   Allergies   Allergen Reactions   • Epinephrine    • Lidocaine Hives   • Moxifloxacin Hives     avelox       Current Outpatient Medications   Medication Sig Dispense Refill   • gabapentin (NEURONTIN) 300 mg capsule Take 300 mg by mouth 3 (three) times a day Only takes 200 mg once a day at night  1   • hydrOXYzine HCL (ATARAX) 25 mg tablet Take 1 tablet (25 mg total) by  mouth every 6 (six) hours as needed for anxiety 90 tablet 1   • morphine (MS CONTIN) 15 mg 12 hr tablet 15 mg every 12 (twelve) hours  0   • sertraline (ZOLOFT) 100 mg tablet Take 1 tablet (100 mg total) by mouth daily 90 tablet 1   • oxyCODONE-acetaminophen (PERCOCET) 5-325 mg per tablet Take 1 tablet by mouth every 6 (six) hours as needed for severe pain (Patient not taking: Reported on 3/3/2025)     • promethazine-dextromethorphan (PHENERGAN-DM) 6.25-15 mg/5 mL oral syrup Take 5 mL by mouth 4 (four) times a day as needed for cough (Patient not taking: Reported on 3/3/2025) 240 mL 0   • tiZANidine (ZANAFLEX) 4 mg tablet take 1 tablet by mouth every 8 hours if needed for SPASMS. MONITOR FOR SEDATION. DO NOT DRIVE (Patient not taking: Reported on 3/3/2025)       No current facility-administered medications for this visit.       Substance Abuse History:    Social History     Substance and Sexual Activity   Alcohol Use No     Social History     Substance and Sexual Activity   Drug Use Yes   • Types: Marijuana       Social History:    Social History     Socioeconomic History   • Marital status: /Civil Union     Spouse name: Not on file   • Number of children: Not on file   • Years of education: Not on file   • Highest education level: Not on file   Occupational History   • Not on file   Tobacco Use   • Smoking status: Never   • Smokeless tobacco: Never   • Tobacco comments:     Per [employer]; Former smoker   Vaping Use   • Vaping status: Never Used   Substance and Sexual Activity   • Alcohol use: No   • Drug use: Yes     Types: Marijuana   • Sexual activity: Not on file   Other Topics Concern   • Not on file   Social History Narrative   • Not on file     Social Drivers of Health     Financial Resource Strain: Not on file   Food Insecurity: Not on file   Transportation Needs: Not on file   Physical Activity: Not on file   Stress: Not on file   Social Connections: Not on file   Intimate Partner Violence: Not on  file   Housing Stability: Not on file       Family Psychiatric History:     Family History   Problem Relation Age of Onset   • Ovarian cancer Sister        Medical History Reviewed by provider this encounter:  Tobacco  Allergies  Meds  Problems  Med Hx  Surg Hx  Fam Hx          Objective   There were no vitals taken for this visit.     Mental Status Evaluation:    Appearance age appropriate, casually dressed   Behavior cooperative, calm   Speech normal rate, normal volume, normal pitch, spontaneous   Mood anxious   Affect normal range and intensity, appropriate   Thought Processes organized, goal directed   Thought Content no overt delusions   Perceptual Disturbances: no auditory hallucinations, no visual hallucinations   Abnormal Thoughts  Risk Potential Suicidal ideation - None  Homicidal ideation - None  Potential for aggression - No   Orientation oriented to person, place, time/date, and situation   Memory recent and remote memory grossly intact   Consciousness alert and awake   Attention Span Concentration Span attention span and concentration are age appropriate   Intellect appears to be of average intelligence   Insight intact   Judgement intact   Muscle Strength and  Gait normal muscle strength and normal muscle tone, normal gait and normal balance   Motor activity no abnormal movements   Language no difficulty naming common objects, no difficulty repeating a phrase, no difficulty writing a sentence   Fund of Knowledge adequate knowledge of current events  adequate fund of knowledge regarding past history  adequate fund of knowledge regarding vocabulary    Pain none   Pain Scale 0       Laboratory Results: I have personally reviewed all pertinent laboratory/tests results    Recent Labs (last 2 months):   No visits with results within 2 Month(s) from this visit.   Latest known visit with results is:   Appointment on 10/20/2018   Component Date Value   • Sodium 10/20/2018 138    • Potassium 10/20/2018  4.2    • Chloride 10/20/2018 106    • CO2 10/20/2018 27    • ANION GAP 10/20/2018 5    • BUN 10/20/2018 11    • Creatinine 10/20/2018 0.57 (L)    • Glucose, Fasting 10/20/2018 79    • Calcium 10/20/2018 10.2 (H)    • Corrected Calcium 10/20/2018 10.4 (H)    • AST 10/20/2018 18    • ALT 10/20/2018 37    • Alkaline Phosphatase 10/20/2018 56    • Total Protein 10/20/2018 6.9    • Albumin 10/20/2018 3.8    • Total Bilirubin 10/20/2018 0.59    • eGFR 10/20/2018 111    • Cholesterol 10/20/2018 178    • Triglycerides 10/20/2018 114    • HDL, Direct 10/20/2018 50    • LDL Calculated 10/20/2018 105 (H)    • Non-HDL-Chol (CHOL-HDL) 10/20/2018 128    • WBC 10/20/2018 5.83    • RBC 10/20/2018 4.26    • Hemoglobin 10/20/2018 12.2    • Hematocrit 10/20/2018 39.5    • MCV 10/20/2018 93    • MCH 10/20/2018 28.6    • MCHC 10/20/2018 30.9 (L)    • RDW 10/20/2018 12.9    • MPV 10/20/2018 10.8    • Platelets 10/20/2018 341    • nRBC 10/20/2018 0    • Segmented % 10/20/2018 46    • Immature Grans % 10/20/2018 0    • Lymphocytes % 10/20/2018 41    • Monocytes % 10/20/2018 10    • Eosinophils Relative 10/20/2018 2    • Basophils Relative 10/20/2018 1    • Absolute Neutrophils 10/20/2018 2.67    • Absolute Immature Grans 10/20/2018 0.02    • Absolute Lymphocytes 10/20/2018 2.39    • Absolute Monocytes 10/20/2018 0.58    • Eosinophils Absolute 10/20/2018 0.10    • Basophils Absolute 10/20/2018 0.07        Suicide/Homicide Risk Assessment:    Risk of Harm to Self:  Protective Factors: no current suicidal ideation, able to make plans for the future, able to manage anger well, access to mental health treatment, being a parent, being , compliant with medications  Based on today's assessment, Georgie presents the following risk of harm to self: minimal    Risk of Harm to Others:  Based on today's assessment, Georgie presents the following risk of harm to others: none    The following interventions are recommended: Continue medication  management. No other intervention changes indicated at this time.    Psychotherapy Provided:     Individual psychotherapy provided: No    Treatment Plan:    Completed and signed during the session: Not applicable - Treatment Plan not due at this session    Goals: Progress towards Treatment Plan goals - in Assessment and Plan Section        Note Share:    This note was shared with patient.    Administrative Statements   Administrative Statements   Encounter provider BAKARI Kennedy    The Patient is located at Home and in the following state in which I hold an active license [place].    The patient was identified by name and date of birth. Georgie Garza was informed that this is a telemedicine visit and that the visit is being conducted through the Epic Embedded platform. She agrees to proceed..  My office door was closed. No one else was in the room.  She acknowledged consent and understanding of privacy and security of the video platform. The patient has agreed to participate and understands they can discontinue the visit at any time.    I have spent a total time of 18 minutes in caring for this patient on the day of the visit/encounter including Risks and benefits of tx options and Documenting in the medical record, not including the time spent for establishing the audio/video connection.    Visit Time  Visit Start Time: 130 PM  Visit Stop Time: 148 PM  Total Visit Duration:  as above minutes    BAKARI Kennedy 03/03/25

## 2025-04-02 ENCOUNTER — OFFICE VISIT (OUTPATIENT)
Dept: PHYSICAL THERAPY | Age: 55
End: 2025-04-02
Payer: COMMERCIAL

## 2025-04-02 DIAGNOSIS — M17.12 OSTEOARTHRITIS OF LEFT KNEE, UNSPECIFIED OSTEOARTHRITIS TYPE: Primary | ICD-10-CM

## 2025-04-02 PROCEDURE — 97161 PT EVAL LOW COMPLEX 20 MIN: CPT
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
## 2025-04-07 ENCOUNTER — OFFICE VISIT (OUTPATIENT)
Dept: PHYSICAL THERAPY | Age: 55
End: 2025-04-07
Payer: COMMERCIAL

## 2025-04-07 DIAGNOSIS — M17.12 OSTEOARTHRITIS OF LEFT KNEE, UNSPECIFIED OSTEOARTHRITIS TYPE: Primary | ICD-10-CM

## 2025-04-07 PROCEDURE — 97110 THERAPEUTIC EXERCISES: CPT

## 2025-04-07 NOTE — PROGRESS NOTES
[place] [address]     [address] [facility] [place] [address]    Phone:  [phone]    Fax:  [phone]       Thank You for choosing us for your health care visit. We are glad to serve you and happy to provide you with this summary of your visit. Please help us to ensure we have accurate records. If you find anything that needs to be changed, please let our staff know as soon as possible.          Your Demographic Information     Patient Name Sex Daisha Presley Female 1941       Ethnic Group Patient Race    Not of  or  Origin White      Your Visit Details     Date & Time Provider Department    2017 7:45 AM Janeth Pop MD [facility] [place]note [place] [address] [place] [address]      Your Upcoming Appointment*(Max 10)     2017  8:40 AM CDT   Lab Visit with RD LAB   [facility] ([facility] )    [address] [place] [address] [phone]            2017 11:15 AM CDT   Follow-up Visit with Janeth Pop MD   [facility] [place]note [facility] [address] [place], [place] [address]  ([facility])    [address] [facility] [address] [phone]              Your To Do List     Follow-Up    Return in about 4 weeks (around 2017).      Conditions Discussed Today or Order-Related Diagnoses        Comments    Other specified hypothyroidism    -  Primary     Hypercholesterolemia         Recurrent major depressive disorder, in full remission (CMS/HCC)         Late onset Alzheimer's disease without behavioral disturbance         Benign essential HTN           Your Vitals Were     BP Pulse Resp Height Weight BMI    148/102 80 20 5' 4\" (1.626 m) 167 lb (75.8 kg) 28.67 kg/m2    Smoking Status                   Former Smoker           Medications Prescribed or Re-Ordered Today     None      Your Current Medications Are        Disp Refills Start End    donepezil (ARICEPT) 10 MG  "Daily Note     Today's date: 2025  Patient name: Georgie Garza  : 1970  MRN: [id number]  Referring provider: Michaela Steen PA-C  Dx:   Encounter Diagnosis     ICD-10-CM    1. Osteoarthritis of left knee, unspecified osteoarthritis type  M17.12           Start Time:   Stop Time:   Total time in clinic (min): 40 minutes    Subjective: Reports history of back and neck pain, goes to pain management. States both of her knees bother her, especially her L. States she was sore after last session, but notices no increase in pain today, feels about the same.       Objective: See treatment diary below      Assessment: Some modifications needed due to LBP, emphasis on engaging core for exercises impacting LB. Modified reps for marches due to discomfort in back. Performed 10 reps instead of 20 for hamstring sets due to fatigue, no pain. Performed QS with towel roll under knee due to pain at maggie-patellar region without. Will continue to focus on LE and core strength and progress as appropriate. Will assess response to today's session next visit and make modifications as needed.  Patient would benefit from continued PT.      Plan: Continue per plan of care.        POC expires Unit limit Auth Expiration date PT/OT + Visit Limit?   25 BOMN  100                           Visit/Unit Tracking  AUTH Status:  Date              No auth needed  Used 1 2              Remaining  99               FOTO 36/50                    Precautions:       Manuals                                                                Neuro Re-Ed                                                                                                        Ther Ex             Nustep  L5 5'            Quad isometrics  5\"x20            HS isometrics  5\"x10           Hip add  30x           Hip abd  BTB 30x            Bridge  2x10            SLR   2x10            Long arch   30x                                      Standing marching  " "W/ TA 30x alt            Heel raises   30x           Toe raises   30x            Calf stretch   L3 30\"x4           Step ups                                                                               Ther Activity                                       Gait Training             Walking for endurance                          Modalities                                            " tablet 90 tablet 0 4/24/2017     Sig: TAKE ONE TABLET BY MOUTH NIGHTLY    Class: Eprescribe    levothyroxine (SYNTHROID, LEVOTHROID) 88 MCG tablet 90 tablet 0 4/24/2017     Sig: TAKE ONE TABLET BY MOUTH ONCE DAILY    Class: Eprescribe    losartan (COZAAR) 50 MG tablet 90 tablet 0 4/24/2017     Sig: TAKE ONE TABLET BY MOUTH DAILY    Class: Eprescribe    pravastatin (PRAVACHOL) 80 MG tablet 90 tablet 0 4/3/2017     Sig: TAKE ONE TABLET BY MOUTH NIGHTLY    Class: Eprescribe    escitalopram (LEXAPRO) 10 MG tablet 90 tablet 1 2/15/2017     Sig: TAKE ONE TABLET BY MOUTH DAILY    Class: Eprescribe    Daily Multiple Vitamins TABS        Sig - Route: Take 1 tablet by mouth daily. - Oral    Class: Historical Med    Omega-3 Fatty Acids (FISH OIL) 1000 MG capsule        Sig - Route: Take 1 g by mouth 2 times daily. - Oral    Class: Historical Med      Allergies     No Known Allergies      Immunizations History as of 6/2/2017     Name Date    Influenza 10/10/2016, 11/24/2015    Pneumococcal Conjugate 13 Valent 8/21/2015    Pneumococcal Polysaccharide Adult 10/31/2016      Problem List as of 6/2/2017     Depression    Alzheimer's dementia without behavioral disturbance    Hypothyroidism    Hypercholesterolemia    Benign essential HTN            Patient Instructions     None

## 2025-04-09 ENCOUNTER — OFFICE VISIT (OUTPATIENT)
Dept: PHYSICAL THERAPY | Age: 55
End: 2025-04-09
Payer: COMMERCIAL

## 2025-04-09 DIAGNOSIS — M17.12 OSTEOARTHRITIS OF LEFT KNEE, UNSPECIFIED OSTEOARTHRITIS TYPE: Primary | ICD-10-CM

## 2025-04-09 PROCEDURE — 97110 THERAPEUTIC EXERCISES: CPT

## 2025-04-09 NOTE — PROGRESS NOTES
"Daily Note     Today's date: 2025  Patient name: Georgie Garza  : 1970  MRN: [id number]  Referring provider: Cassandra Crisostomo, PT  Dx:   Encounter Diagnosis     ICD-10-CM    1. Osteoarthritis of left knee, unspecified osteoarthritis type  M17.12           Start Time: 1800  Stop Time: 1845  Total time in clinic (min): 45 minutes    Subjective: Pt reports soreness after first treatment session on Monday. She complains of pain in her right lumbo pelvic region and right knee.       Objective: See treatment diary below      Assessment: Continued current POC only by increasing resistance or repetitions as outlined below. While completing LAQs it created pain in low back. Added LTRs and DKTC for pain relief which had the opposite effect. Added supine TA contractions and provided Vcs during standing marching, SLRs, and bridges to engage her core. Tolerated treatment fair. Patient demonstrated fatigue post treatment      Plan: Continue per plan of care.        POC expires Unit limit Auth Expiration date PT/OT + Visit Limit?   25 BOMN  100                           Visit/Unit Tracking  AUTH Status:  Date             No auth needed  Used 1 2 3             Remaining  99 98 97             FOTO 36/50                    Precautions:       Manuals           LAD R hip    JM           Foam roller   JM                                    Neuro Re-Ed                                                                                                        Ther Ex             Nustep  L5 5'  L5 5'           Quad isometrics  5\"x20  X15 5\"           HS isometrics  5\"x10 X15 5\"           Hip add  30x 30x           Hip abd  BTB 30x  Btb x30           Bridge  2x10  2x10           SLR   2x10  2x10          Long arch   30x  20x 1#           LTRs   X10 pain          DKTC   X10 pain           Doc kicks    5' 7#           Supine TA contractions   X10 10\"                        Standing marching  W/ TA 30x alt  W/ TA " "x10 pain           Heel raises   30x 30x           Toe raises   30x  30x           Calf stretch   L3 30\"x4 L2 30\"x4           Step ups                                                                               Ther Activity                                       Gait Training             Walking for endurance                          Modalities                                              "

## 2025-04-14 ENCOUNTER — OFFICE VISIT (OUTPATIENT)
Dept: PHYSICAL THERAPY | Age: 55
End: 2025-04-14
Payer: COMMERCIAL

## 2025-04-14 DIAGNOSIS — M17.12 OSTEOARTHRITIS OF LEFT KNEE, UNSPECIFIED OSTEOARTHRITIS TYPE: Primary | ICD-10-CM

## 2025-04-14 PROCEDURE — 97110 THERAPEUTIC EXERCISES: CPT

## 2025-04-14 NOTE — PROGRESS NOTES
"Daily Note     Today's date: 2025  Patient name: Georgie Garza  : 1970  MRN: [id number]  Referring provider: Michaela Steen PA-C  Dx:   Encounter Diagnosis     ICD-10-CM    1. Osteoarthritis of left knee, unspecified osteoarthritis type  M17.12           Start Time: 1745  Stop Time: 1830  Total time in clinic (min): 45 minutes    Subjective: After last session patient experienced increased pain in her back and along her R LE.     Objective: See treatment diary below      Assessment: Discussed transitioning to aquatic therapy based on her difficulty performing exercises on land and her increased pain throughout. She does not feel comfortable going in the pool despite education and being a good candidate. She describes pain in the LB and butt that radiates to the from of the hip and down the knee. I completed a log roll and RICHY both of which were positive, indicating R hip OA. Added a hip flexor stretch. Tolerated treatment fair. Patient demonstrated fatigue post treatment      Plan:  continue POC        POC expires Unit limit Auth Expiration date PT/OT + Visit Limit?   25 BOMN  100                           Visit/Unit Tracking  AUTH Status:  Date            No auth needed  Used 1 2 3 4            Remaining  99 98 97 96            FOTO 36/50                    Precautions:       Manuals          LAD R hip    JM  JM            JM                                    Neuro Re-Ed                                                                                                        Ther Ex             Nustep  L5 5'  L5 5'  L5 10'          Quad isometrics  5\"x20  X15 5\"  X15 5\"          HS isometrics  5\"x10 X15 5\"           Hip add  30x 30x  30x         Hip abd  BTB 30x  Btb x30  Btb x30         Bridge  2x10  2x10  2x10          SLR   2x10  2x10 2x10         Long arch   30x  20x 1#  20x 1#          LTRs   X10 pain          DKTC   X10 pain           Doc kicks    5' 7#  9# " "2'         Supine TA contractions   X10 10\"  X15 10\"                       Standing marching  W/ TA 30x alt  W/ TA x10 pain           Heel raises   30x 30x           Toe raises   30x  30x           Calf stretch   L3 30\"x4 L2 30\"x4  L2 30\" x4          Hip flexor stretch     3x15 \"                                                              Ther Activity                                       Gait Training             Walking for endurance                          Modalities                                                "

## 2025-04-16 ENCOUNTER — OFFICE VISIT (OUTPATIENT)
Dept: PHYSICAL THERAPY | Age: 55
End: 2025-04-16
Payer: COMMERCIAL

## 2025-04-16 DIAGNOSIS — M17.12 OSTEOARTHRITIS OF LEFT KNEE, UNSPECIFIED OSTEOARTHRITIS TYPE: Primary | ICD-10-CM

## 2025-04-16 PROCEDURE — 97110 THERAPEUTIC EXERCISES: CPT

## 2025-04-16 NOTE — PROGRESS NOTES
"Daily Note     Today's date: 2025  Patient name: Georgie Garza  : 1970  MRN: [id number]  Referring provider: Michaela Steen PA-C  Dx:   Encounter Diagnosis     ICD-10-CM    1. Osteoarthritis of left knee, unspecified osteoarthritis type  M17.12           Start Time: 1745  Stop Time: 1830  Total time in clinic (min): 45 minutes    Subjective: Pt complains of L knee pain and R hip and knee pain. The R sided pain being worse than the L .       Objective: See treatment diary below      Assessment: We spent a lot of time discussing differential diagnoses for her pain and PMH. The patient demonstrated fair understanding. Continued current POC only by increasing resistance or repetitions as outlined below. Tolerated treatment fair. Patient demonstrated fatigue post treatment      Plan: Continue per plan of care.        POC expires Unit limit Auth Expiration date PT/OT + Visit Limit?   25 BOMN  100                           Visit/Unit Tracking  AUTH Status:  Date           No auth needed  Used 1 2 3 4 5           Remaining  99 98 97 96 95           FOTO 36/50                    Precautions:       Manuals         LAD R hip    JM  JM JM           JM                                    Neuro Re-Ed                                                                                                        Ther Ex             Nustep  L5 5'  L5 5'  L5 6'  L5 6'        Quad isometrics  5\"x20  X15 5\"  X15 5\"          HS sets   5\" x10 5\" x10  5\" x10         Hip add  30x 30x  30x 30x        Hip abd  BTB 30x  Btb x30  Btb x30 Btb x30        Bridge  2x10  2x10  2x10          SLR   2x10  2x10 2x10 2x10        Long arch   30x  20x 1#  20x 1#  20x 1#        Doc kicks    5' 7#  9# 2' 9# 2'         Supine TA contractions   X10 10\"  X15 10\"                       Standing marching  W/ TA 30x alt  W/ TA x10 pain     Seated        Heel raises   30x 30x   30x         Toe raises   30x  30x   " "30x         Calf stretch   L3 30\"x4 L2 30\"x4  L2 30\" x4  L2 30\"x4        Hip flexor stretch     3x15 \"  3x30\"                                                             Ther Activity                                       Gait Training             Walking for endurance                          Modalities                                                  "

## 2025-04-21 ENCOUNTER — OFFICE VISIT (OUTPATIENT)
Dept: PHYSICAL THERAPY | Age: 55
End: 2025-04-21
Payer: COMMERCIAL

## 2025-04-21 DIAGNOSIS — M17.12 OSTEOARTHRITIS OF LEFT KNEE, UNSPECIFIED OSTEOARTHRITIS TYPE: Primary | ICD-10-CM

## 2025-04-21 PROCEDURE — 97110 THERAPEUTIC EXERCISES: CPT

## 2025-04-21 NOTE — PROGRESS NOTES
"Daily Note     Today's date: 2025  Patient name: Georgie Garza  : 1970  MRN: [id number]  Referring provider: Michaela Steen PA-C  Dx:   Encounter Diagnosis     ICD-10-CM    1. Osteoarthritis of left knee, unspecified osteoarthritis type  M17.12           Start Time: 1745  Stop Time: 1830  Total time in clinic (min): 45 minutes    Subjective: Pt has pain in both knees and her R hip jt      Objective: See treatment diary below      Assessment: Continued current POC only by increasing resistance or repetitions as outlined below. Regressed marching to sitting and she experienced back pain. During manual stretching and the nustep she describes pain the back of the L knee. Tolerated treatment fair. Patient demonstrated fatigue post treatment      Plan: Continue per plan of care.        POC expires Unit limit Auth Expiration date PT/OT + Visit Limit?   25 BOMN  100                           Visit/Unit Tracking  AUTH Status:  Date          No auth needed  Used 1 2 3 4 5 6          Remaining  99 98 97 96 95 94          FOTO 36/50                    Precautions:       Manuals        LAD R hip    JM  JM JM JM                                              Neuro Re-Ed                                                                                                        Ther Ex             Nustep  L5 5'  L5 5'  L5 6'  L5 6' L5 6'        Quad isometrics  5\"x20  X15 5\"  X15 5\"          HS sets   5\" x10 5\" x10  5\" x10  10\" x10       Hip add  30x 30x  30x 30x 30x        Hip abd  BTB 30x  Btb x30  Btb x30 Btb x30 Btb x30        Bridge  2x10  2x10  2x10   2x10        SLR   2x10  2x10 2x10 2x10 2x10       Long arch   30x  20x 1#  20x 1#  20x 1# 30x 1#        Doc kicks    5' 7#  9# 2' 9# 2'  2'       Supine TA contractions   X10 10\"  X15 10\"                       Standing marching  W/ TA 30x alt  W/ TA x10 pain     Seated x30 2#         Heel raises   30x 30x   30x  " "30x        Toe raises   30x  30x   30x         Calf stretch   L3 30\"x4 L2 30\"x4  L2 30\" x4  L2 30\"x4 L3 30\"x4        Hip flexor stretch     3x15 \"  3x30\"  4x30\"                                                            Ther Activity                                       Gait Training             Walking for endurance                          Modalities                                                    "

## 2025-04-23 ENCOUNTER — OFFICE VISIT (OUTPATIENT)
Dept: PHYSICAL THERAPY | Age: 55
End: 2025-04-23
Payer: COMMERCIAL

## 2025-04-23 DIAGNOSIS — M17.12 OSTEOARTHRITIS OF LEFT KNEE, UNSPECIFIED OSTEOARTHRITIS TYPE: Primary | ICD-10-CM

## 2025-04-23 PROCEDURE — 97110 THERAPEUTIC EXERCISES: CPT | Performed by: PHYSICAL THERAPIST

## 2025-04-23 NOTE — PROGRESS NOTES
"Daily Note     Today's date: 2025  Patient name: Georgie Garza  : 1970  MRN: [id number]  Referring provider: Cassandra Crisostomo, PT  Dx:   Encounter Diagnosis     ICD-10-CM    1. Osteoarthritis of left knee, unspecified osteoarthritis type  M17.12           Start Time: 05  Stop Time: 628  Total time in clinic (min): 42 minutes    Subjective:  Notes right hip and thigh discomfort at 4/10 at time of arrival. Notes 2/10 left knee pain. Feels the NuStep aggravates left knee sometimes. Patient notes typical pain levels today.  Notes feeling a little stronger since PT start but pain is unchanging.       Objective: See treatment diary below      Assessment: Tolerated treatment fair. Patient notes right LBP post exercises (-6/10).  Limited progression with pain persisting. Responded well to right long leg distraction/leg pull.       Plan: Continue per plan of care.        POC expires Unit limit Auth Expiration date PT/OT + Visit Limit?   25 BOMN  100                           Visit/Unit Tracking  AUTH Status:  Date         No auth needed  Used 1 2 3 4 5 6 7         Remaining  99 98 97 96 [phone] 93         FOTO 36/50                    Precautions:       Manuals       LAD R hip    JM  JM JM JM PP                                             Neuro Re-Ed                                       Ther Ex             Nustep  L5 5'  L5 5'  L5 6'  L5 6' L5 6'  L5  6'      Quad isometrics  5\"x20  X15 5\"  X15 5\"          HS sets   5\" x10 5\" x10  5\" x10  10\" x10 10'/  10x      Hip add  30x 30x  30x 30x 30x  30x      Hip abd  BTB 30x  Btb x30  Btb x30 Btb x30 Btb x30  BTB  30x      Bridge  2x10  2x10  2x10   2x10  2x10      SLR   2x10  2x10 2x10 2x10 2x10 2x10      Long arch   30x  20x 1#  20x 1#  20x 1# 30x 1#  20x  2#      Doc kicks    5' 7#  9# 2' 9# 2'  2' 2'      Supine TA contractions   X10 10\"  X15 10\"                       Standing marching  W/ TA 30x " "alt  W/ TA x10 pain     Seated x30 2#   Seated  2#  30x      Heel raises   30x 30x   30x  30x  30x      Toe raises   30x  30x   30x         Calf stretch   L3 30\"x4 L2 30\"x4  L2 30\" x4  L2 30\"x4 L3 30\"x4  L3  30\"/4x      Hip flexor stretch     3x15 \"  3x30\"  4x30\"                                                            Ther Activity                                       Gait Training             Walking for endurance                          Modalities                                                      "

## 2025-04-28 ENCOUNTER — OFFICE VISIT (OUTPATIENT)
Dept: PHYSICAL THERAPY | Age: 55
End: 2025-04-28
Attending: PHYSICIAN ASSISTANT
Payer: COMMERCIAL

## 2025-04-28 DIAGNOSIS — M17.12 OSTEOARTHRITIS OF LEFT KNEE, UNSPECIFIED OSTEOARTHRITIS TYPE: Primary | ICD-10-CM

## 2025-04-28 PROCEDURE — 97110 THERAPEUTIC EXERCISES: CPT

## 2025-04-28 NOTE — PROGRESS NOTES
"Daily Note     Today's date: 2025  Patient name: Georgie Garza  : 1970  MRN: [id number]  Referring provider: Cassandra Crisostomo, PT  Dx:   Encounter Diagnosis     ICD-10-CM    1. Osteoarthritis of left knee, unspecified osteoarthritis type  M17.12           Start Time: 1745  Stop Time: 1830  Total time in clinic (min): 45 minutes    Subjective: Reports some improvement, but continues with R hip pain.       Objective: See treatment diary below      Assessment: Unable to progress today due to low tolerance to exercise. Attempted marching in chair, but c/o R hip pain and unable to complete. Patient was able to complete seated on table instead, but noted pain continued to linger at R hip post session. Relief with LAD.       Plan: Continue per plan of care.        POC expires Unit limit Auth Expiration date PT/OT + Visit Limit?   25 BOMN  100                           Visit/Unit Tracking  AUTH Status:  Date        No auth needed  Used 1 2 3 4 5 6 7 8        Remaining  99 98 97 96 [phone]        FOTO 36/50                    Precautions:       Manuals      LAD R hip    JM  JM JM JM PP JM                                            Neuro Re-Ed                                       Ther Ex             Nustep  L5 5'  L5 5'  L5 6'  L5 6' L5 6'  L5  6' L5 6'      Quad isometrics  5\"x20  X15 5\"  X15 5\"          HS sets   5\" x10 5\" x10  5\" x10  10\" x10 10'/  10x 10\"x10     Hip add  30x 30x  30x 30x 30x  30x 30x     Hip abd  BTB 30x  Btb x30  Btb x30 Btb x30 Btb x30  BTB  30x BTB 30x      Bridge  2x10  2x10  2x10   2x10  2x10 2x10      SLR   2x10  2x10 2x10 2x10 2x10 2x10 2x10 (assist R)     Long arch   30x  20x 1#  20x 1#  20x 1# 30x 1#  20x  2# 2# 20x      Doc kicks    5' 7#  9# 2' 9# 2'  2' 2' 9# B 2'      Supine TA contractions   X10 10\"  X15 10\"                       Standing marching  W/ TA 30x alt  W/ TA x10 pain     Seated x30 2#   " "Seated  2#  30x Seated on table  2#  30x     Heel raises   30x 30x   30x  30x  30x 30x      Toe raises   30x  30x   30x         Calf stretch   L3 30\"x4 L2 30\"x4  L2 30\" x4  L2 30\"x4 L3 30\"x4  L3  30\"/4x L3 30\"x4      Hip flexor stretch     3x15 \"  3x30\"  4x30\"                                                            Ther Activity                                       Gait Training             Walking for endurance                          Modalities                                                        "

## 2025-04-30 ENCOUNTER — EVALUATION (OUTPATIENT)
Dept: PHYSICAL THERAPY | Age: 55
End: 2025-04-30
Attending: PHYSICIAN ASSISTANT
Payer: COMMERCIAL

## 2025-04-30 DIAGNOSIS — M17.12 OSTEOARTHRITIS OF LEFT KNEE, UNSPECIFIED OSTEOARTHRITIS TYPE: Primary | ICD-10-CM

## 2025-04-30 PROCEDURE — 97110 THERAPEUTIC EXERCISES: CPT

## 2025-04-30 PROCEDURE — 97530 THERAPEUTIC ACTIVITIES: CPT
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
## 2025-05-05 ENCOUNTER — OFFICE VISIT (OUTPATIENT)
Dept: PHYSICAL THERAPY | Age: 55
End: 2025-05-05
Attending: PHYSICIAN ASSISTANT
Payer: COMMERCIAL

## 2025-05-05 DIAGNOSIS — M17.12 OSTEOARTHRITIS OF LEFT KNEE, UNSPECIFIED OSTEOARTHRITIS TYPE: Primary | ICD-10-CM

## 2025-05-05 PROCEDURE — 97110 THERAPEUTIC EXERCISES: CPT

## 2025-05-05 NOTE — PROGRESS NOTES
"Daily Note     Today's date: 2025  Patient name: Georgie Garza  : 1970  MRN: [id number]  Referring provider: Cassandra Crisostomo, RAQUEL  Dx:   Encounter Diagnosis     ICD-10-CM    1. Osteoarthritis of left knee, unspecified osteoarthritis type  M17.12           Start Time: 1745  Stop Time: 1830  Total time in clinic (min): 45 minutes    Subjective: Pt is achy and sore today       Objective: See treatment diary below      Assessment: Added supine abduction, this was difficult but patient was able to complete prescribed sets and reps. Added weights to several other exercises. Tolerated treatment well. Patient demonstrated fatigue post treatment      Plan: Continue per plan of care.        POC expires Unit limit Auth Expiration date PT/OT + Visit Limit?   25 BOMN  100                           Visit/Unit Tracking  AUTH Status:  Date  5/     No auth needed  Used 1 2 3 4 5 6 7 8 9 10      Remaining  99 98 97 96 95 94 93 92 91 90      FOTO 36/50                    Precautions:       Manuals  5/5   LAD R hip    VANIA CARO JM PP VANIA CARO JM                                          Neuro Re-Ed                                       Ther Ex             Nustep  L5 5'  L5 5'  L5 6'  L5 6' L5 6'  L5  6' L5 6'  Bike 5'  5'    HS sets   5\" x10 5\" x10  5\" x10  10\" x10 10'/  10x 10\"x10     Hip add  30x 30x  30x 30x 30x  30x 30x     Hip abd  BTB 30x  Btb x30  Btb x30 Btb x30 Btb x30  BTB  30x BTB 30x   Black x30    Bridge  2x10  2x10  2x10   2x10  2x10 2x10   2x10    SLR   2x10  2x10 2x10 2x10 2x10 2x10 2x10 (assist R)  2X10 1#    Long arch   30x  20x 1#  20x 1#  20x 1# 30x 1#  20x  2# 2# 20x   3# L/2# R 20x    Doc kicks    5' 7#  9# 2' 9# 2'  2' 2' 9# B 2'      Standing marching  W/ TA 30x alt  W/ TA x10 pain     Seated x30 2#   Seated  2#  30x Seated on table  2#  30x  Seated on table  3#  30x   Heel raises   30x 30x   30x  30x  30x 30x   20x 10# " "   Toe raises   30x  30x   30x         Calf stretch   L3 30\"x4 L2 30\"x4  L2 30\" x4  L2 30\"x4 L3 30\"x4  L3  30\"/4x L3 30\"x4   L3 30\"x4    Hip flexor stretch     3x15 \"  3x30\"  4x30\"        Supine hip abduction          2X10                                           Ther Activity                                       Gait Training             Walking for endurance                          Modalities                                                "

## 2025-05-09 ENCOUNTER — OFFICE VISIT (OUTPATIENT)
Dept: PHYSICAL THERAPY | Age: 55
End: 2025-05-09
Attending: PHYSICIAN ASSISTANT
Payer: COMMERCIAL

## 2025-05-09 DIAGNOSIS — M17.12 OSTEOARTHRITIS OF LEFT KNEE, UNSPECIFIED OSTEOARTHRITIS TYPE: Primary | ICD-10-CM

## 2025-05-09 PROCEDURE — 97110 THERAPEUTIC EXERCISES: CPT

## 2025-05-09 NOTE — PROGRESS NOTES
"Daily Note     Today's date: 2025  Patient name: Georgie Garza  : 1970  MRN: [id number]  Referring provider: Cassandra Crisostomo, RAQUEL  Dx: No diagnosis found.    Start Time: 0900  Stop Time: 0945  Total time in clinic (min): 45 minutes    Subjective: She went to the chiropractor last night and whatever maneuvers were performed is creating intense pain in her LB. She describes it as \"throwing out,\" her back       Objective: See treatment diary below      Assessment: Continued current POC only by increasing resistance or repetitions as outlined below. Regressed bridges to GS to accommodate for back pain. Tolerated treatment well. Patient demonstrated fatigue post treatment      Plan: Continue per plan of care.        POC expires Unit limit Auth Expiration date PT/OT + Visit Limit?   25 BOMN  100                           Visit/Unit Tracking  AUTH Status:  Date     No auth needed  Used 1 2 3 4 5 6 7 8 9 10 11     Remaining  99 98 97 96 95 94 93 92 91 90 89     FOTO 36/50                    Precautions:       Manuals    LAD R hip  VANIA CARO PP VANIA CARO                                          Neuro Re-Ed                                       Ther Ex             Nustep 5'  L5 5'  L5 5'  L5 6'  L5 6' L5 6'  L5  6' L5 6'  Bike 5'  5'    HS sets   5\" x10 5\" x10  5\" x10  10\" x10 10'/  10x 10\"x10     Hip add X30  30x 30x  30x 30x 30x  30x 30x     Hip abd Black tb x30  BTB 30x  Btb x30  Btb x30 Btb x30 Btb x30  BTB  30x BTB 30x   Black x30    Bridge 2x10  2x10  2x10  2x10   2x10  2x10 2x10   2x10    SLR  X10 2#   X10  2x10  2x10 2x10 2x10 2x10 2x10 2x10 (assist R)  2X10 1#    Long arch  3/30 30x  20x 1#  20x 1#  20x 1# 30x 1#  20x  2# 2# 20x   3# L/2# R 20x    SAQs 3/20             Doc kicks    5' 7#  9# 2' 9# 2'  2' 2' 9# B 2'      Standing marching 3/  W/ TA 30x alt  W/ TA x10 pain     Seated x30 2#   " "Seated  2#  30x Seated on table  2#  30x  Seated on table  3#  30x   Heel raises   30x 30x   30x  30x  30x 30x   20x 10#    Toe raises   30x  30x   30x         Calf stretch  L3 30\"x4  L3 30\"x4 L2 30\"x4  L2 30\" x4  L2 30\"x4 L3 30\"x4  L3  30\"/4x L3 30\"x4   L3 30\"x4    Supine hip abduction nv         2X10    Tb extensions              Supine cane ff  x15                         Ther Activity                                       Gait Training             Walking for endurance                          Modalities                                                  "

## 2025-05-12 ENCOUNTER — OFFICE VISIT (OUTPATIENT)
Dept: PHYSICAL THERAPY | Age: 55
End: 2025-05-12
Attending: PHYSICIAN ASSISTANT
Payer: COMMERCIAL

## 2025-05-12 DIAGNOSIS — M17.12 OSTEOARTHRITIS OF LEFT KNEE, UNSPECIFIED OSTEOARTHRITIS TYPE: Primary | ICD-10-CM

## 2025-05-12 PROCEDURE — 97110 THERAPEUTIC EXERCISES: CPT

## 2025-05-12 NOTE — PROGRESS NOTES
Daily Note     Today's date: 2025  Patient name: Georgie Garza  : 1970  MRN: [id number]  Referring provider: Cassandra Crisostomo, RAQUEL  Dx:   Encounter Diagnosis     ICD-10-CM    1. Osteoarthritis of left knee, unspecified osteoarthritis type  M17.12           Start Time: 1745  Stop Time: 1830  Total time in clinic (min): 45 minutes    Subjective: Pt continues to have back pain following the chiropractor, in addition to pain in her neck and her knee.       Objective: See treatment diary below      Assessment: Pt had decreased tolerance to lying flat because of the referring pain from her neck to her R shoulder. Continued current POC only by increasing resistance or repetitions as outlined below. Tolerated treatment well. Patient demonstrated fatigue post treatment      Plan: Continue per plan of care.        POC expires Unit limit Auth Expiration date PT/OT + Visit Limit?   25 BOMN  100                           Visit/Unit Tracking  AUTH Status:  Date    No auth needed  Used 1 2 3 4 5 6 7 8 9 10 11 12    Remaining  99 98 97 96 95 94 93 92 91 90 89 88    FOTO 36/50                    Precautions:       Manuals  5/   LAD R hip  VANIA CARO                                          Neuro Re-Ed                                       Ther Ex             Nustep 5'  L5 5'  L5 5'  L5 6'  L5 6' L5 6'  L5  6' L5 6'  Bike 5'  5'    Hip add X30  Cybex 10#/3x8  30x  30x 30x 30x  30x 30x     Hip abd Black tb x30  Cybex 10#/3x8  Btb x30  Btb x30 Btb x30 Btb x30  BTB  30x BTB 30x   Black x30    Bridge 2x10   2x10  2x10   2x10  2x10 2x10   2x10    SLR  X10 2#   X10  2x10 2#  2x10 2x10 2x10 2x10 2x10 2x10 (assist R)  2X10 1#    Long arch  3/30 3/30 20x 1#  20x 1#  20x 1# 30x 1#  20x  2# 2# 20x   3# L/2# R 20x    SAQs 3/20             Doc kicks    5' 7#  9# 2' 9# 2'  2' 2' 9# B 2'      Standing marching 3/30   "Seated 3/30 W/ TA x10 pain     Seated x30 2#   Seated  2#  30x Seated on table  2#  30x  Seated on table  3#  30x   Heel raises   30x 30x   30x  30x  30x 30x   20x 10#    Toe raises   30x  30x   30x         Calf stretch  L3 30\"x4  L3 30\"x4 L2 30\"x4  L2 30\" x4  L2 30\"x4 L3 30\"x4  L3  30\"/4x L3 30\"x4   L3 30\"x4    Supine hip abduction nv 2x10         2X10    Tb extensions              Supine cane ff  x15                         Ther Activity                                       Gait Training             Walking for endurance                          Modalities                                                    "

## 2025-05-14 ENCOUNTER — OFFICE VISIT (OUTPATIENT)
Dept: PHYSICAL THERAPY | Age: 55
End: 2025-05-14
Attending: PHYSICIAN ASSISTANT
Payer: COMMERCIAL

## 2025-05-14 DIAGNOSIS — M17.12 OSTEOARTHRITIS OF LEFT KNEE, UNSPECIFIED OSTEOARTHRITIS TYPE: Primary | ICD-10-CM

## 2025-05-14 PROCEDURE — 97012 MECHANICAL TRACTION THERAPY: CPT

## 2025-05-14 PROCEDURE — 97110 THERAPEUTIC EXERCISES: CPT

## 2025-05-14 NOTE — PROGRESS NOTES
Daily Note     Today's date: 2025  Patient name: Georgie Garza  : 1970  MRN: [id number]  Referring provider: Cassandra Crisostomo PT  Dx:   Encounter Diagnosis     ICD-10-CM    1. Osteoarthritis of left knee, unspecified osteoarthritis type  M17.12           Start Time: 1750  Stop Time: 1845  Total time in clinic (min): 55 minutes    Subjective: Pt felt sore after the addition of the machines. She continues to have back pain that has now travelled to the L side but your main complaint rn is the neck and the shoulder.      Objective: See treatment diary below  Pt would benefit from either lumbar or cervical traction to decrease pain, increase jt mobility, increase muscle relaxation, increase soft tissue elasticity, along with the other benefits that mechanical traction presents. Indications include disc herniations, joint hypomobility, muscle spasms, nerve root impingement, narrowing of the intervertebral foramen, etc.     Contraindications:   - acute inflammation:   - acute sprains and strains  - subluxation   - increased pain or radicular s/s   - osteoporosis  - peripheralization of s/s       Cervical contraindications specifically:   - positive alar ligament test  - positive vertebral artery test  - pregnancy  - TMJ      Assessment: Had to skip plinth interventions because lying flat exacerbated her neck and shoulder symptoms. Less limited our ability to treat the knee. Addressed her neck symptoms today with MT and traction as a modality. She had symptom relief afterwards. Encouraged her to reach out to her PCP for a script to treat her neck, she demonstrated understanding. Tolerated treatment fair. Patient demonstrated fatigue post treatment      Plan: Continue per plan of care.        POC expires Unit limit Auth Expiration date PT/OT + Visit Limit?   25 BOMN  100                           Visit/Unit Tracking  AUTH Status:  Date 5/14 4/7 4/9 4/14 4/16 4/21 4/23 4/28 4/30 5/5 5/9 5/12   No auth needed  " Used 13 2 3 4 5 6 7 8 9 10 11 12    Remaining  87 98 97 96 95 94 93 92 91 90 89 88    FOTO 36/50                    Precautions:       Manuals 5/9 5/12 5/14 4/14 4/16 4/21 4/23 4/28 4/30 5/5   LAD R hip  JM JM JM  JM JM JM PP VANIA JM JM   R shoulder PROM   JM          Cervical distraction    JM          Mechanical cervical traction    Static   10'  10lb           Neuro Re-Ed                                       Ther Ex             Nustep 5'  L5 5'  L5 5'  L5 6'  L5 6' L5 6'  L5  6' L5 6'  Bike 5'  5'    Hip add X30  Cybex 10#/3x8  Cybex 10#/30 30x 30x 30x  30x 30x     Hip abd Black tb x30  Cybex 10#/3x8  Cybex 10#/30 Btb x30 Btb x30 Btb x30  BTB  30x BTB 30x   Black x30    Bridge 2x10    2x10   2x10  2x10 2x10   2x10    SLR  X10 2#   X10  2x10 2#   2x10 2x10 2x10 2x10 2x10 (assist R)  2X10 1#    Long arch  3/30 3/30 3/30 20x 1#  20x 1# 30x 1#  20x  2# 2# 20x   3# L/2# R 20x    Standing marching 3/30  Seated 3/30 Seated 3/30    Seated x30 2#   Seated  2#  30x Seated on table  2#  30x  Seated on table  3#  30x   Heel raises   30x 30x   30x  30x  30x 30x   20x 10#    Toe raises   30x  30x   30x         Calf stretch  L3 30\"x4  L3 30\"x4 L2 30\"x4  L2 30\" x4  L2 30\"x4 L3 30\"x4  L3  30\"/4x L3 30\"x4   L3 30\"x4    Supine hip abduction nv 2x10         2X10    Tb extensions    Gtb 3x10           Supine cane ff  x15                         Ther Activity                                       Gait Training             Walking for endurance                          Modalities                                                      "

## 2025-05-19 ENCOUNTER — OFFICE VISIT (OUTPATIENT)
Dept: PHYSICAL THERAPY | Age: 55
End: 2025-05-19
Attending: PHYSICIAN ASSISTANT
Payer: COMMERCIAL

## 2025-05-19 DIAGNOSIS — M17.12 OSTEOARTHRITIS OF LEFT KNEE, UNSPECIFIED OSTEOARTHRITIS TYPE: Primary | ICD-10-CM

## 2025-05-19 PROCEDURE — 97110 THERAPEUTIC EXERCISES: CPT

## 2025-05-19 PROCEDURE — 97012 MECHANICAL TRACTION THERAPY: CPT

## 2025-05-19 NOTE — PROGRESS NOTES
Daily Note     Today's date: 2025  Patient name: Georgie Garza  : 1970  MRN: [id number]  Referring provider: Cassandra Crisostomo PT  Dx:   Encounter Diagnosis     ICD-10-CM    1. Osteoarthritis of left knee, unspecified osteoarthritis type  M17.12           Start Time: 1745  Stop Time: 1830  Total time in clinic (min): 45 minutes    Subjective: Pt is in severe pain and her chief complaint is the radicular symptoms from her neck that have now radiated into her thumb.       Objective: See treatment diary below      Assessment: Skipped plinth exercises as lying flat aggravated her radiculars. Discussed discharge at the end of the scheduled land visits as she does not tolerate a lot, she is in agreement. She would benefit from aquatic therapy and I discussed this with her again before discharge. Encouraged her to gte a script from her doctor for her neck and UE pain. Tolerated treatment fair. Patient demonstrated fatigue post treatment      Plan: Continue per plan of care.        POC expires Unit limit Auth Expiration date PT/OT + Visit Limit?   25 BOMN  100                           Visit/Unit Tracking  AUTH Status:  Date    No auth needed  Used 13 14 3 4 5 6 7 8 9 10 11 12    Remaining  87 86 97 96 95 94 93 92 91 90 89 88    FOTO 36/50                    Precautions:       Manuals  5   LAD R hip  JM VANIA CARO PP VANIA CARO   R shoulder PROM   JM          Cervical distraction    JM          Mechanical cervical traction    Static   10'  10lb  Static   10'  10lb          Neuro Re-Ed                                       Ther Ex             Nustep 5'  L5 5'  L5 5'  L3 10'  L5 6' L5 6'  L5  6' L5 6'  Bike 5'  5'    Hip add X30  Cybex 10#/3x8  Cybex 10#/30 X30  30x 30x  30x 30x     Hip abd Black tb x30  Cybex 10#/3x8  Cybex 10#/30 Black x30  Btb x30 Btb x30  BTB  30x BTB 30x   Black x30    Bridge 2x10       "2x10  2x10 2x10   2x10    SLR  X10 2#   X10  2x10 2#    2x10 2x10 2x10 2x10 (assist R)  2X10 1#    Long arch  3/30 3/30 3/30 2/30 20x 1# 30x 1#  20x  2# 2# 20x   3# L/2# R 20x    Standing marching 3/30  Seated 3/30 Seated 3/30 2/30 seated    Seated x30 2#   Seated  2#  30x Seated on table  2#  30x  Seated on table  3#  30x   Heel raises   30x 30x  30x  30x  30x  30x 30x   20x 10#    Toe raises   30x  30x   30x         Calf stretch  L3 30\"x4  L3 30\"x4 L2 30\"x4  L3 30\"x4  L2 30\"x4 L3 30\"x4  L3  30\"/4x L3 30\"x4   L3 30\"x4    Supine hip abduction nv 2x10         2X10    Tb extensions    Gtb 3x10           Supine cane ff  x15            pullies    2' rot         Ther Activity                                       Gait Training             Walking for endurance                          Modalities                                                        "

## 2025-05-21 ENCOUNTER — OFFICE VISIT (OUTPATIENT)
Dept: PHYSICAL THERAPY | Age: 55
End: 2025-05-21
Attending: PHYSICIAN ASSISTANT
Payer: COMMERCIAL

## 2025-05-21 DIAGNOSIS — M17.12 OSTEOARTHRITIS OF LEFT KNEE, UNSPECIFIED OSTEOARTHRITIS TYPE: Primary | ICD-10-CM

## 2025-05-21 PROCEDURE — 97110 THERAPEUTIC EXERCISES: CPT

## 2025-05-21 NOTE — PROGRESS NOTES
Daily Note     Today's date: 2025  Patient name: Georgie Garza  : 1970  MRN: [id number]  Referring provider: Cassandra Crisostomo, PT  Dx:   Encounter Diagnosis     ICD-10-CM    1. Osteoarthritis of left knee, unspecified osteoarthritis type  M17.12           Start Time: 1745  Stop Time: 1830  Total time in clinic (min): 45 minutes    Subjective: Patient reports that her left knee feels better and rates 0/10 pain.  Patient states that her right knee feels horrible and rates pain as 5/10.      Objective: See treatment diary below      Assessment: Tolerated treatment fair.   Patient participated in skilled PT session focused on strengthening, stretching, and ROM.  Patient able to complete exercise with increased pain to R knee.  Patient able to perform exercises this session with no pain in L knee.  Patient unable to tolerated sitting in chair this session and requested to sit on plinth with posterior lean.  Patient needs updated HEP for home after discharge. Patient would continue to benefit from skilled PT interventions to address strengthening, stretching, and ROM. Patient demonstrated fatigue post treatment      Plan: Continue per plan of care.        POC expires Unit limit Auth Expiration date PT/OT + Visit Limit?   25 BOMN  100                           Visit/Unit Tracking  AUTH Status:  Date    No auth needed  Used 13 14 15 4 5 6 7 8 9 10 11 12    Remaining  87 86 85 96 95 94 93 92 91 90 89 88    FOTO 36/50                    Precautions:       Manuals  5/   LAD R hip  JM JM JM    JM PP VANAI CARO   R shoulder PROM   JM          Cervical distraction    JM          Mechanical cervical traction    Static   10'  10lb  Static   10'  10lb          Neuro Re-Ed                  Sidestepping x 3 laps mild pain R knee                     Ther Ex             Nustep 5'  L5 5'  L5 5'  L3 10'  L3 x 10' L5 6'   "L5  6' L5 6'  Bike 5'  5'    Hip add X30  Cybex 10#/3x8  Cybex 10#/30 X30  10# 30x 30x  30x 30x     Hip abd Black tb x30  Cybex 10#/3x8  Cybex 10#/30 Black x30  Cybex   10# 30x Btb x30  BTB  30x BTB 30x   Black x30    Bridge 2x10      2x10  2x10 2x10   2x10    SLR  X10 2#   X10  2x10 2#     2x10 2x10 2x10 (assist R)  2X10 1#    Long arch  3/30 3/30 3/30 2/30 3# B/L seated 30x ea  RLE hurts low back R 30x 1#  20x  2# 2# 20x   3# L/2# R 20x    Standing marching 3/30  Seated 3/30 Seated 3/30 2/30 seated  3# B/L seated  30x Seated x30 2#   Seated  2#  30x Seated on table  2#  30x  Seated on table  3#  30x   Heel raises   30x 30x  30x  30x 30x  30x 30x   20x 10#    Toe raises   30x  30x           Calf stretch  L3 30\"x4  L3 30\"x4 L2 30\"x4  L3 30\"x4  L3 30\" 4x L3 30\"x4  L3  30\"/4x L3 30\"x4   L3 30\"x4    Supine hip abduction nv 2x10         2X10    Tb extensions    Gtb 3x10           Supine cane ff  x15            pullies    2' rot         Ther Activity                                       Gait Training             Walking for endurance                          Modalities                                                          "

## 2025-05-21 NOTE — HOME EXERCISE EDUCATION
Program_ID:[id number]   Access Code: [email or web address]  URL: [email or web address]  Date: 05-  Prepared By: Cassandra Crisostomo    Program Notes      Exercises      - Seated Hip Abduction with Resistance - 1 x daily - 7 x weekly - 3 sets - 10 reps      - Seated Hip Adduction Isometrics with Ball - 1 x daily - 7 x weekly - 3 sets - 10 reps      - Seated March - 1 x daily - 7 x weekly - 3 sets - 10 reps      - Seated Long Arc Quad - 1 x daily - 7 x weekly - 3 sets - 10 reps      - Heel Raises with Counter Support - 1 x daily - 7 x weekly - 3 sets - 10 reps

## 2025-05-28 ENCOUNTER — OFFICE VISIT (OUTPATIENT)
Dept: PHYSICAL THERAPY | Age: 55
End: 2025-05-28
Attending: PHYSICIAN ASSISTANT
Payer: COMMERCIAL

## 2025-05-28 DIAGNOSIS — M17.12 OSTEOARTHRITIS OF LEFT KNEE, UNSPECIFIED OSTEOARTHRITIS TYPE: Primary | ICD-10-CM

## 2025-05-28 PROCEDURE — 97140 MANUAL THERAPY 1/> REGIONS: CPT | Performed by: PHYSICAL THERAPIST

## 2025-05-28 PROCEDURE — 97110 THERAPEUTIC EXERCISES: CPT | Performed by: PHYSICAL THERAPIST

## 2025-05-28 NOTE — PROGRESS NOTES
DISCHARGE    Today's date: 2025  Patient name: Georgie Garza  : 1970  MRN: [id number]  Referring provider: Cassandra Crisostomo, PT  Dx:   Encounter Diagnosis     ICD-10-CM    1. Osteoarthritis of left knee, unspecified osteoarthritis type  M17.12           Start Time: 1800  Stop Time: 1845  Total time in clinic (min): 45 minutes    Subjective: Patient reports left knee is good but right knee is painful      Objective: See treatment diary below      Assessment: Tolerated treatment fair. Patient exhibited good technique with therapeutic exercises. At this time, patient has achieved their maximum functional benefit from skilled physical therapy services and will be discharged to their HEP.  Patient is in agreement with the plan of care.  As a result, patient is discharged from physical therapy.        Plan: D/C to HEP       POC expires Unit limit Auth Expiration date PT/OT + Visit Limit?   25 BOMN  100                           Visit/Unit Tracking  AUTH Status:  Date    No auth needed  Used 13 14 15 16 5 6 7 8 9 10 11 12    Remaining  87 86 85 96 95 94 93 92 91 90 89 88    FOTO 36/50                    Precautions:       Manuals    LAD R hip  JM JM JM    JM PP JM JM JM   R shoulder PROM   JM          Cervical distraction    JM          Mechanical cervical traction    Static   10'  10lb  Static   10'  10lb          Neuro Re-Ed                  Sidestepping x 3 laps mild pain R knee                     Ther Ex             Nustep 5'  L5 5'  L5 5'  L3 10'  L3 x 10' L5 6'  L5  6' L5 6'  Bike 5'  5'    Hip add X30  Cybex 10#/3x8  Cybex 10#/30 X30  10# 30x 15/30  30x 30x     Hip abd Black tb x30  Cybex 10#/3x8  Cybex 10#/30 Black x30  Cybex   10# 30x 15/30 BTB  30x BTB 30x   Black x30    Bridge 2x10      2x10  2x10 2x10   2x10    SLR  X10 2#   X10  2x10 2#     2x10 2x10 2x10 (assist R)  2X10 1#    Long arch   "3/30 3/30 3/30 2/30 3# B/L seated 30x ea  RLE hurts low back R 3/30 20x  2# 2# 20x   3# L/2# R 20x    Standing marching 3/30  Seated 3/30 Seated 3/30 2/30 seated  3# B/L seated  30x Seated x30 2#   Seated  2#  30x Seated on table  2#  30x  Seated on table  3#  30x   Heel raises   30x 30x  30x  30x 30x  30x 30x   20x 10#    Toe raises   30x  30x           Calf stretch  L3 30\"x4  L3 30\"x4 L2 30\"x4  L3 30\"x4  L3 30\" 4x L3 30\"x4  L3  30\"/4x L3 30\"x4   L3 30\"x4    Supine hip abduction nv 2x10         2X10    Tb extensions    Gtb 3x10           Supine cane ff  x15            pullies    2' rot         Ther Activity             HEP      5 min                    Gait Training             Walking for endurance                          Modalities                                                            "

## 2025-06-19 ENCOUNTER — TELEPHONE (OUTPATIENT)
Age: 55
End: 2025-06-19

## 2025-06-19 NOTE — TELEPHONE ENCOUNTER
NP called in to be scheduled for neuropathy appt. Pt is having neuropathy in upper and lower extremities. Pt has a history of neck issues (she sees pain management for that). The numbness and tingling started in oct 2024. Pt is not sure if it feels like it starts with her neck or not.     Pt thought had a shoulder went to the doctor and was told it is her neck.     Pt deals with the numbness and tingling everyday . It feels like current of electricity going down her arms. Her fingers hurt badly, pt stated the pain is at an 8.     The electric feeling does not happen in her legs just the numbness and tingling here and there.       Pt is taking morphine x 2 15 mg, daily gabapentin 300 mg at night only,  and tizanidine 4mg tablet as needed.      Pt is scheduled with  for 09/04/2025 @ 330pm     Pt stated she will be having imaging faxed over to [facility].

## 2025-06-24 DIAGNOSIS — F41.1 GENERALIZED ANXIETY DISORDER: ICD-10-CM

## 2025-06-25 RX ORDER — SERTRALINE HYDROCHLORIDE 100 MG/1
100 TABLET, FILM COATED ORAL DAILY
Qty: 90 TABLET | Refills: 0 | Status: SHIPPED | OUTPATIENT
Start: 2025-06-25

## 2025-07-08 ENCOUNTER — TELEMEDICINE (OUTPATIENT)
Dept: PSYCHIATRY | Facility: CLINIC | Age: 55
End: 2025-07-08
Payer: COMMERCIAL

## 2025-07-08 DIAGNOSIS — F41.1 GENERALIZED ANXIETY DISORDER: Primary | ICD-10-CM

## 2025-07-08 DIAGNOSIS — Z63.0 MARITAL CONFLICT: ICD-10-CM

## 2025-07-08 PROBLEM — L93.0 LUPUS ERYTHEMATOSUS: Status: ACTIVE | Noted: 2025-07-08

## 2025-07-08 PROBLEM — N94.6 DYSMENORRHEA: Status: ACTIVE | Noted: 2025-07-08

## 2025-07-08 PROBLEM — N92.0 EXCESSIVE AND FREQUENT MENSTRUATION: Status: ACTIVE | Noted: 2025-07-08

## 2025-07-08 PROCEDURE — 99214 OFFICE O/P EST MOD 30 MIN: CPT

## 2025-07-08 SDOH — SOCIAL STABILITY - SOCIAL INSECURITY: PROBLEMS IN RELATIONSHIP WITH SPOUSE OR PARTNER: Z63.0

## 2025-07-08 NOTE — PSYCH
"MEDICATION MANAGEMENT NOTE    Name: Georgie Garza      : 1970      MRN: [id number]  Encounter Provider: BAKARI Kennedy  Encounter Date: 2025   Encounter department: [facility]    Insurance: Payor: BLUE CROSS / Plan: MISC BLUE CROSS / Product Type: Blue Fee for Service /      Reason for Visit: No chief complaint on file.  :  Assessment & Plan  Generalized anxiety disorder  -Continue Zoloft 100 mg daily   PARQ completed including serotonin syndrome, SIADH, worsening depression, suicidality, induction of yi, GI upset, headaches, activation, sexual side effects, sedation, potential drug interactions, and others.     -Utilize p.r.n. Atarax as needed for breakthrough anxiety       Marital conflict             Treatment Recommendations:    Educated about diagnosis and treatment modalities. Verbalizes understanding and agreement with the treatment plan.  Discussed self monitoring of symptoms, and symptom monitoring tools.  Discussed medications and if treatment adjustment was needed or desired.  Aware of 24 hour and weekend coverage for urgent situations accessed by calling [facility] main practice number  I am scheduling this patient out for greater than 3 months: No    Medications Risks/Benefits:      Risks, Benefits And Possible Side Effects Of Medications:    Risks, benefits, and possible side effects of medications explained to Georgie and she (or legal representative) verbalizes understanding and agreement for treatment.    Controlled Medication Discussion:     Not applicable      History of Present Illness     Georgie is seen today for a follow up for anxiety. She reports that she continues to do fairly well since the last visit.  Georgie reports \" I had finally gotten a  and initiated the divorce process\" which she was considering for many years.  Describes how it surprised her  as he did not believe she would ever take " action.  Stressful situation for patient as she is going to have a more difficult time financially but overall believes this will be a positive step for her future.  She is anticipating clinic step in the process and appears optimistic.  Reports mild situational depression and anxiety from this process, denies any severe symptoms.  She continues to report stabilization with Zoloft 100 mg daily and occasional as needed Atarax to break anxiety.  She continues denies side effects of medications and no changes required today.  Reach out if further support needed.  Denies SI.        She denies any side effects from current psychiatric medications.    HPI ROS Appetite Changes and Sleep:     She reports normal sleep, adequate appetite, low energy    Review Of Systems: A review of systems is obtained and is negative except for the pertinent positives listed in HPI/Subjective above.      Current Rating Scores:     None completed today.    Areas of Improvement: reviewed in HPI/Subjective Section and reviewed in Assessment and Plan Section        Past Medical History:   Diagnosis Date   • Carpal tunnel syndrome    • Fibromyalgia    • Lupus    • Spinal stenosis         Past Surgical History:   Procedure Laterality Date   •  SECTION     • KNEE SURGERY      Knee Arthroscopy (Therapeutic)     Allergies:   Allergies   Allergen Reactions   • Epinephrine    • Lidocaine Hives   • Moxifloxacin Hives     avelox       Current Outpatient Medications   Medication Sig Dispense Refill   • gabapentin (NEURONTIN) 300 mg capsule Take 300 mg by mouth in the morning and 300 mg in the evening and 300 mg before bedtime. Only takes 200 mg once a day at night.  1   • hydrOXYzine HCL (ATARAX) 25 mg tablet Take 1 tablet (25 mg total) by mouth every 6 (six) hours as needed for anxiety 90 tablet 1   • morphine (MS CONTIN) 15 mg 12 hr tablet 15 mg every 12 (twelve) hours  0   • sertraline (ZOLOFT) 100 mg tablet TAKE 1 TABLET DAILY 90 tablet 0   •  oxyCODONE-acetaminophen (PERCOCET) 5-325 mg per tablet Take 1 tablet by mouth every 6 (six) hours as needed for severe pain (Patient not taking: Reported on 7/8/2025)     • promethazine-dextromethorphan (PHENERGAN-DM) 6.25-15 mg/5 mL oral syrup Take 5 mL by mouth 4 (four) times a day as needed for cough (Patient not taking: Reported on 12/6/2024) 240 mL 0   • tiZANidine (ZANAFLEX) 4 mg tablet take 1 tablet by mouth every 8 hours if needed for SPASMS. MONITOR FOR SEDATION. DO NOT DRIVE (Patient not taking: Reported on 7/8/2025)       No current facility-administered medications for this visit.       Substance Abuse History:    Social History     Substance and Sexual Activity   Alcohol Use No     Social History     Substance and Sexual Activity   Drug Use Yes   • Types: Marijuana       Social History:    Social History     Socioeconomic History   • Marital status: /Civil Union     Spouse name: Not on file   • Number of children: Not on file   • Years of education: Not on file   • Highest education level: Not on file   Occupational History   • Not on file   Tobacco Use   • Smoking status: Never   • Smokeless tobacco: Never   • Tobacco comments:     Per [employer]; Former smoker   Vaping Use   • Vaping status: Never Used   Substance and Sexual Activity   • Alcohol use: No   • Drug use: Yes     Types: Marijuana   • Sexual activity: Not on file   Other Topics Concern   • Not on file   Social History Narrative   • Not on file     Social Drivers of Health     Financial Resource Strain: Not on file   Food Insecurity: Not on file   Transportation Needs: Not on file   Physical Activity: Not on file   Stress: Not on file   Social Connections: Not on file   Intimate Partner Violence: Not on file   Housing Stability: Not on file       Family Psychiatric History:     Family History   Problem Relation Name Age of Onset   • Ovarian cancer Sister         Medical History Reviewed by provider this encounter:  Tobacco  Allergies   Meds  Problems  Med Hx  Surg Hx  Fam Hx          Objective   There were no vitals taken for this visit.     Mental Status Evaluation:    Appearance age appropriate, casually dressed   Behavior cooperative, calm   Speech normal rate, normal volume, normal pitch, spontaneous   Mood improved, anxious   Affect normal range and intensity, appropriate   Thought Processes organized, goal directed   Thought Content no overt delusions   Perceptual Disturbances: no auditory hallucinations, no visual hallucinations   Abnormal Thoughts  Risk Potential Suicidal ideation - None  Homicidal ideation - None  Potential for aggression - No   Orientation oriented to person, place, time/date, and situation   Memory recent and remote memory grossly intact   Consciousness alert and awake   Attention Span Concentration Span attention span and concentration are age appropriate   Intellect appears to be of average intelligence   Insight intact   Judgement intact   Muscle Strength and  Gait normal muscle strength and normal muscle tone, normal gait and normal balance   Motor activity no abnormal movements   Language no difficulty naming common objects, no difficulty repeating a phrase, no difficulty writing a sentence   Fund of Knowledge adequate knowledge of current events  adequate fund of knowledge regarding past history  adequate fund of knowledge regarding vocabulary    Pain none   Pain Scale 0       Laboratory Results: I have personally reviewed all pertinent laboratory/tests results    Recent Labs (last 2 months):   No visits with results within 2 Month(s) from this visit.   Latest known visit with results is:   Appointment on 10/20/2018   Component Date Value   • Sodium 10/20/2018 138    • Potassium 10/20/2018 4.2    • Chloride 10/20/2018 106    • CO2 10/20/2018 27    • ANION GAP 10/20/2018 5    • BUN 10/20/2018 11    • Creatinine 10/20/2018 0.57 (L)    • Glucose, Fasting 10/20/2018 79    • Calcium 10/20/2018 10.2 (H)    •  Corrected Calcium 10/20/2018 10.4 (H)    • AST 10/20/2018 18    • ALT 10/20/2018 37    • Alkaline Phosphatase 10/20/2018 56    • Total Protein 10/20/2018 6.9    • Albumin 10/20/2018 3.8    • Total Bilirubin 10/20/2018 0.59    • eGFR 10/20/2018 111    • Cholesterol 10/20/2018 178    • Triglycerides 10/20/2018 114    • HDL, Direct 10/20/2018 50    • LDL Calculated 10/20/2018 105 (H)    • Non-HDL-Chol (CHOL-HDL) 10/20/2018 128    • WBC 10/20/2018 5.83    • RBC 10/20/2018 4.26    • Hemoglobin 10/20/2018 12.2    • Hematocrit 10/20/2018 39.5    • MCV 10/20/2018 93    • MCH 10/20/2018 28.6    • MCHC 10/20/2018 30.9 (L)    • RDW 10/20/2018 12.9    • MPV 10/20/2018 10.8    • Platelets 10/20/2018 341    • nRBC 10/20/2018 0    • Segmented % 10/20/2018 46    • Immature Grans % 10/20/2018 0    • Lymphocytes % 10/20/2018 41    • Monocytes % 10/20/2018 10    • Eosinophils Relative 10/20/2018 2    • Basophils Relative 10/20/2018 1    • Absolute Neutrophils 10/20/2018 2.67    • Absolute Immature Grans 10/20/2018 0.02    • Absolute Lymphocytes 10/20/2018 2.39    • Absolute Monocytes 10/20/2018 0.58    • Eosinophils Absolute 10/20/2018 0.10    • Basophils Absolute 10/20/2018 0.07        Suicide/Homicide Risk Assessment:    Risk of Harm to Self:  Protective Factors: no current suicidal ideation, able to make plans for the future, able to manage anger well, access to mental health treatment, being a parent, being , compliant with medications  Based on today's assessment, Georgie presents the following risk of harm to self: minimal    Risk of Harm to Others:  Based on today's assessment, Georgie presents the following risk of harm to others: none    The following interventions are recommended: Continue medication management. No other intervention changes indicated at this time.    Psychotherapy Provided:     Individual psychotherapy provided: No    Treatment Plan:    Completed and signed during the session: Not applicable -  Treatment Plan not due at this session    Goals: Progress towards Treatment Plan goals - in Assessment and Plan Section        Note Share:    This note was shared with patient.    Administrative Statements   Administrative Statements   Encounter provider BAKARI Kennedy    The Patient is located at Home and in the following state in which I hold an active license [place].    The patient was identified by name and date of birth. Georgie Garza was informed that this is a telemedicine visit and that the visit is being conducted through the Epic Embedded platform. She agrees to proceed..  My office door was closed. No one else was in the room.  She acknowledged consent and understanding of privacy and security of the video platform. The patient has agreed to participate and understands they can discontinue the visit at any time.    I have spent a total time of 17 minutes in caring for this patient on the day of the visit/encounter including Risks and benefits of tx options and Documenting in the medical record, not including the time spent for establishing the audio/video connection.    Visit Time  Visit Start Time: 130 PM  Visit Stop Time: 147 PM  Total Visit Duration: 17 minutes    BAKARI Kennedy 07/08/25

## 2025-08-12 ENCOUNTER — OFFICE VISIT (OUTPATIENT)
Dept: URGENT CARE | Age: 55
End: 2025-08-12
Payer: COMMERCIAL